# Patient Record
Sex: FEMALE | Race: WHITE | HISPANIC OR LATINO | Employment: FULL TIME | URBAN - METROPOLITAN AREA
[De-identification: names, ages, dates, MRNs, and addresses within clinical notes are randomized per-mention and may not be internally consistent; named-entity substitution may affect disease eponyms.]

---

## 2018-02-28 ENCOUNTER — CONVERSION ENCOUNTER (OUTPATIENT)
Dept: MAMMOGRAPHY | Facility: CLINIC | Age: 48
End: 2018-02-28

## 2018-06-20 ENCOUNTER — TELEPHONE (OUTPATIENT)
Dept: FAMILY MEDICINE CLINIC | Facility: CLINIC | Age: 48
End: 2018-06-20

## 2018-08-02 ENCOUNTER — OFFICE VISIT (OUTPATIENT)
Dept: FAMILY MEDICINE CLINIC | Facility: CLINIC | Age: 48
End: 2018-08-02
Payer: COMMERCIAL

## 2018-08-02 VITALS
TEMPERATURE: 98 F | HEIGHT: 60 IN | BODY MASS INDEX: 27.48 KG/M2 | SYSTOLIC BLOOD PRESSURE: 106 MMHG | HEART RATE: 74 BPM | WEIGHT: 140 LBS | OXYGEN SATURATION: 99 % | RESPIRATION RATE: 16 BRPM | DIASTOLIC BLOOD PRESSURE: 70 MMHG

## 2018-08-02 DIAGNOSIS — R73.9 HYPERGLYCEMIA: ICD-10-CM

## 2018-08-02 DIAGNOSIS — L21.9 DERMATITIS, SEBORRHEIC: ICD-10-CM

## 2018-08-02 DIAGNOSIS — Z00.01 ENCOUNTER FOR GENERAL ADULT MEDICAL EXAMINATION WITH ABNORMAL FINDINGS: ICD-10-CM

## 2018-08-02 DIAGNOSIS — R00.2 PALPITATIONS: ICD-10-CM

## 2018-08-02 DIAGNOSIS — E78.00 PURE HYPERCHOLESTEROLEMIA: ICD-10-CM

## 2018-08-02 DIAGNOSIS — R79.89 LFT ELEVATION: ICD-10-CM

## 2018-08-02 DIAGNOSIS — Q89.3 SITUS INVERSUS: Primary | ICD-10-CM

## 2018-08-02 PROBLEM — Z01.411 ENCOUNTER FOR GYNECOLOGICAL EXAMINATION WITH ABNORMAL FINDING: Status: ACTIVE | Noted: 2018-05-03

## 2018-08-02 PROCEDURE — 99396 PREV VISIT EST AGE 40-64: CPT | Performed by: FAMILY MEDICINE

## 2018-08-02 PROCEDURE — 93000 ELECTROCARDIOGRAM COMPLETE: CPT | Performed by: FAMILY MEDICINE

## 2018-08-02 RX ORDER — ERGOCALCIFEROL (VITAMIN D2) 1250 MCG
50000 CAPSULE ORAL WEEKLY
COMMUNITY
End: 2020-08-03 | Stop reason: ALTCHOICE

## 2018-08-02 RX ORDER — TRAZODONE HYDROCHLORIDE 50 MG/1
50 TABLET ORAL
COMMUNITY
End: 2018-10-02 | Stop reason: ALTCHOICE

## 2018-08-02 RX ORDER — MOMETASONE FUROATE 1 MG/ML
SOLUTION TOPICAL DAILY
Qty: 120 ML | Refills: 5 | Status: SHIPPED | OUTPATIENT
Start: 2018-08-02 | End: 2018-10-02 | Stop reason: ALTCHOICE

## 2018-08-02 NOTE — PROGRESS NOTES
Assessment/Plan:    No problem-specific Assessment & Plan notes found for this encounter  Diagnoses and all orders for this visit:    Situs inversus  -     POCT ECG    Pure hypercholesterolemia  -     Lipid Panel with Direct LDL reflex; Future    Hyperglycemia  -     Hemoglobin A1C; Future    LFT elevation  -     Comprehensive metabolic panel; Future  -     Hepatitis panel, acute; Future    Palpitations  -     CBC and differential; Future  -     TSH, 3rd generation with Free T4 reflex; Future    Dermatitis, seborrheic  -     mometasone (ELOCON) 0 1 % lotion; Apply topically daily    Other orders  -     traZODone (DESYREL) 50 mg tablet; Take 50 mg by mouth daily at bedtime  -     ergocalciferol (ERGOCALCIFEROL) 13293 units capsule; Take 50,000 Units by mouth once a week          Subjective:      Patient ID: Mary Marley is a 56 y o  female  Pt here for annual physical exam         The following portions of the patient's history were reviewed and updated as appropriate: allergies, current medications, past family history, past medical history, past social history, past surgical history and problem list     Review of Systems   Constitutional: Positive for unexpected weight change  Negative for fatigue and fever  HENT: Negative for sore throat and trouble swallowing  Eyes: Negative for photophobia  Respiratory: Negative for cough, chest tightness, shortness of breath and wheezing  Cardiovascular: Negative for chest pain, palpitations and leg swelling  Gastrointestinal: Negative for abdominal pain, blood in stool, nausea and vomiting  Genitourinary: Negative for hematuria  Neurological: Negative for dizziness, syncope, light-headedness and headaches  Hematological: Does not bruise/bleed easily           Objective:      /70 (BP Location: Left arm, Patient Position: Sitting, Cuff Size: Standard)   Pulse 74   Temp 98 °F (36 7 °C) (Oral)   Resp 16   Ht 4' 11 5" (1 511 m)   Wt 63 5 kg (140 lb)   SpO2 99%   BMI 27 80 kg/m²          Physical Exam   Constitutional: She is oriented to person, place, and time  She appears well-developed and well-nourished  Obese looking  HENT:   Head: Normocephalic  Eyes: EOM are normal  Pupils are equal, round, and reactive to light  Neck: Neck supple  Cardiovascular: Normal rate and regular rhythm  Pulmonary/Chest: Effort normal and breath sounds normal    Abdominal: Soft  Bowel sounds are normal    Musculoskeletal: Normal range of motion  Neurological: She is alert and oriented to person, place, and time  She has normal reflexes  Skin: Skin is warm and dry  Psychiatric: She has a normal mood and affect   Her behavior is normal  Judgment and thought content normal

## 2018-08-02 NOTE — ASSESSMENT & PLAN NOTE
Patient has elevation of blood sugar without diagnosis of diabetes  Are going to check hemoglobin A1c today

## 2018-08-02 NOTE — PATIENT INSTRUCTIONS
Ejercicio seguro   LO QUE NECESITA SABER:   ¿Qué es la seguridad del ejercicio? La seguridad del ejercicio incluye el uso de equipos y puestos correctos para trabbajar los músculos sin causar más daño  Usted deberá saber qué ejercicios hacer y con qué frecuencia hacerlos  También deberá saber qué hacer si siente o piensa que un ejercicio le provocó sreekanth [place]  No empiece ningún programa de ejercicios antes de hablar con palm médico  Deberá esperar hasta que la hinchazón y el dolor se vayan para comenzar a ejercitarse  ¿Qué lindsey saber antes de ejercitar? · Los ejercicios ayudan a disminuir el dolor y la inflamación después de sreekanth Larinda Stacks  También ayudan a fortalecer los músculos que rebeka soporte a las articulaciones y [place]  [place] puede ayudar a prevenir otra [place]  · Puede que necesite sreekanth pesa liviana o sreekanth handy de ejercicio para hacer algunos ejercicios  Palm médico le indicará el peso con el que debe ejercitarse  [place]ig[place]e con palm médico dónde comprar sreekanth pesa o sreekanth handy de ejercicio  · Palm médico le indicará la frecuencia de cada ejercico  También le dirá cuántas veces debe repetir cada ejercicio y cuántas series debe hacer  Le puede indicar hacer diferentes ejercicios en distintos días  · Entre en calor y estírese antes de hacer ejercicio  Use sreekanth bicicleta estacionaria o camine kaitlyn 5 a 10 minutos para que zita músculos entren en calor  El [address] a aumentar el rango de [place]  También puede aliviar el dolor muscular y ayudar a prevenir otra [place]  Palm médico le mostrará qué estiramientos debe hacer  ¿Qué lindsey hacer para ejercitar de sreekanth forma smith? · Muévase lenta y suavemente  Evite movimientos rápidos o bruscos  [place] ayudará a evitar otra lesión  · Respire normalmente  No contenga la respiración  Es importante inspirar y exhalar para no tensionarse kaitlyn el ejercicio  La tensión le podría impedir  la articulación en un rango completo de movimiento  · Deténgase si siente dolor zakia o aumento del dolor  Suspenda el ejercicio y póngase en contacto con palm médico si se presentan estos síntomas  Es normal sentir Adama Osbaldo, tremayne dolor sordo, kaitlyn el ejercicio  Practicar los ejercicios con regularidad ayudará a disminuir palm incomodidad con el paso del [place]  ¿Cuándo lindsey comunicarme con mi médico?   · Tiene dolor zakia cuando hace ejercicio o está en reposo  · Tiene preguntas o inquietudes acerca de los estiramientos o los ejercicios  ACUERDOS SOBRE PALM CUIDADO:   Usted tiene el derecho de ayudar a planear palm cuidado  Aprenda todo lo que pueda sobre palm condición y tremayne darle tratamiento  Discuta zita opciones de tratamiento con zita médicos para decidir el cuidado que usted desea recibir  Usted siempre tiene el derecho de rechazar el tratamiento  Esta información es sólo para uso en educación  Palm intención no es darle un consejo médico sobre enfermedades o tratamientos  Colsulte con palm Carollee Balta farmacéutico antes de seguir cualquier régimen médico para saber si es seguro y efectivo para usted  © 2017 [address]  Information is for End User's use only and may not be sold, redistributed or otherwise used for commercial purposes  All illustrations and images included in CareNotes® are the copyrighted property of A D A M , Inc  or German Taylor

## 2018-08-17 ENCOUNTER — APPOINTMENT (OUTPATIENT)
Dept: LAB | Facility: HOSPITAL | Age: 48
End: 2018-08-17
Payer: COMMERCIAL

## 2018-08-17 DIAGNOSIS — R00.2 PALPITATIONS: ICD-10-CM

## 2018-08-17 DIAGNOSIS — R73.9 HYPERGLYCEMIA: ICD-10-CM

## 2018-08-17 DIAGNOSIS — R79.89 LFT ELEVATION: ICD-10-CM

## 2018-08-17 DIAGNOSIS — E78.00 PURE HYPERCHOLESTEROLEMIA: ICD-10-CM

## 2018-08-17 LAB
ALBUMIN SERPL BCP-MCNC: 3.9 G/DL (ref 3–5.2)
ALP SERPL-CCNC: 76 U/L (ref 43–122)
ALT SERPL W P-5'-P-CCNC: 16 U/L (ref 9–52)
ANION GAP SERPL CALCULATED.3IONS-SCNC: 6 MMOL/L (ref 5–14)
AST SERPL W P-5'-P-CCNC: 22 U/L (ref 14–36)
BASOPHILS # BLD AUTO: 0 THOUSANDS/ΜL (ref 0–0.1)
BASOPHILS NFR BLD AUTO: 1 % (ref 0–1)
BILIRUB SERPL-MCNC: 0.2 MG/DL
BUN SERPL-MCNC: 16 MG/DL (ref 5–25)
CALCIUM SERPL-MCNC: 9.1 MG/DL (ref 8.4–10.2)
CHLORIDE SERPL-SCNC: 110 MMOL/L (ref 97–108)
CHOLEST SERPL-MCNC: 202 MG/DL
CO2 SERPL-SCNC: 27 MMOL/L (ref 22–30)
CREAT SERPL-MCNC: 0.59 MG/DL (ref 0.6–1.2)
EOSINOPHIL # BLD AUTO: 0.2 THOUSAND/ΜL (ref 0–0.4)
EOSINOPHIL NFR BLD AUTO: 4 % (ref 0–6)
ERYTHROCYTE [DISTWIDTH] IN BLOOD BY AUTOMATED COUNT: 17.2 %
GFR SERPL CREATININE-BSD FRML MDRD: 110 ML/MIN/1.73SQ M
GLUCOSE P FAST SERPL-MCNC: 97 MG/DL (ref 70–99)
HCT VFR BLD AUTO: 30 % (ref 36–46)
HDLC SERPL-MCNC: 51 MG/DL (ref 40–59)
HGB BLD-MCNC: 8.9 G/DL (ref 12–16)
HYPERCHROMIA BLD QL SMEAR: PRESENT
LDLC SERPL CALC-MCNC: 129 MG/DL
LYMPHOCYTES # BLD AUTO: 2.2 THOUSANDS/ΜL (ref 0.5–4)
LYMPHOCYTES NFR BLD AUTO: 40 % (ref 20–50)
MCH RBC QN AUTO: 20.2 PG (ref 26–34)
MCHC RBC AUTO-ENTMCNC: 29.7 G/DL (ref 31–36)
MCV RBC AUTO: 68 FL (ref 80–100)
MONOCYTES # BLD AUTO: 0.5 THOUSAND/ΜL (ref 0.2–0.9)
MONOCYTES NFR BLD AUTO: 9 % (ref 1–10)
NEUTROPHILS # BLD AUTO: 2.6 THOUSANDS/ΜL (ref 1.8–7.8)
NEUTS SEG NFR BLD AUTO: 47 % (ref 45–65)
PLATELET # BLD AUTO: 238 THOUSANDS/UL (ref 150–450)
PLATELET BLD QL SMEAR: ADEQUATE
PMV BLD AUTO: 8 FL (ref 8.9–12.7)
POTASSIUM SERPL-SCNC: 4.7 MMOL/L (ref 3.6–5)
PROT SERPL-MCNC: 7.3 G/DL (ref 5.9–8.4)
RBC # BLD AUTO: 4.41 MILLION/UL (ref 4–5.2)
RBC MORPH BLD: NORMAL
SODIUM SERPL-SCNC: 143 MMOL/L (ref 137–147)
TRIGL SERPL-MCNC: 110 MG/DL
TSH SERPL DL<=0.05 MIU/L-ACNC: 1.54 UIU/ML (ref 0.47–4.68)
WBC # BLD AUTO: 5.6 THOUSAND/UL (ref 4.5–11)

## 2018-08-17 PROCEDURE — 84443 ASSAY THYROID STIM HORMONE: CPT

## 2018-08-17 PROCEDURE — 83036 HEMOGLOBIN GLYCOSYLATED A1C: CPT

## 2018-08-17 PROCEDURE — 36415 COLL VENOUS BLD VENIPUNCTURE: CPT

## 2018-08-17 PROCEDURE — 85025 COMPLETE CBC W/AUTO DIFF WBC: CPT

## 2018-08-17 PROCEDURE — 80074 ACUTE HEPATITIS PANEL: CPT

## 2018-08-17 PROCEDURE — 80061 LIPID PANEL: CPT

## 2018-08-17 PROCEDURE — 80053 COMPREHEN METABOLIC PANEL: CPT

## 2018-08-18 LAB
EST. AVERAGE GLUCOSE BLD GHB EST-MCNC: 105 MG/DL
HAV IGM SER QL: NORMAL
HBA1C MFR BLD: 5.3 % (ref 4.2–6.3)
HBV CORE IGM SER QL: NORMAL
HBV SURFACE AG SER QL: NORMAL
HCV AB SER QL: NORMAL

## 2018-08-20 DIAGNOSIS — D50.8 OTHER IRON DEFICIENCY ANEMIA: Primary | ICD-10-CM

## 2018-08-20 RX ORDER — FERROUS SULFATE TAB EC 324 MG (65 MG FE EQUIVALENT) 324 (65 FE) MG
324 TABLET DELAYED RESPONSE ORAL
Qty: 60 TABLET | Refills: 0 | Status: SHIPPED | OUTPATIENT
Start: 2018-08-20 | End: 2018-10-02 | Stop reason: ALTCHOICE

## 2018-10-02 ENCOUNTER — OFFICE VISIT (OUTPATIENT)
Dept: FAMILY MEDICINE CLINIC | Facility: CLINIC | Age: 48
End: 2018-10-02
Payer: COMMERCIAL

## 2018-10-02 VITALS
HEART RATE: 74 BPM | RESPIRATION RATE: 16 BRPM | WEIGHT: 138 LBS | BODY MASS INDEX: 27.09 KG/M2 | DIASTOLIC BLOOD PRESSURE: 70 MMHG | OXYGEN SATURATION: 99 % | TEMPERATURE: 98 F | SYSTOLIC BLOOD PRESSURE: 110 MMHG | HEIGHT: 60 IN

## 2018-10-02 DIAGNOSIS — L21.9 SEBORRHEIC DERMATITIS: ICD-10-CM

## 2018-10-02 DIAGNOSIS — L21.9 DERMATITIS, SEBORRHEIC: ICD-10-CM

## 2018-10-02 DIAGNOSIS — M25.562 ACUTE PAIN OF LEFT KNEE: Primary | ICD-10-CM

## 2018-10-02 PROCEDURE — 99214 OFFICE O/P EST MOD 30 MIN: CPT | Performed by: FAMILY MEDICINE

## 2018-10-02 PROCEDURE — 1036F TOBACCO NON-USER: CPT | Performed by: FAMILY MEDICINE

## 2018-10-02 RX ORDER — LANOLIN ALCOHOL/MO/W.PET/CERES
1 CREAM (GRAM) TOPICAL 3 TIMES DAILY
Qty: 30 TABLET | Refills: 0 | Status: SHIPPED | OUTPATIENT
Start: 2018-10-02 | End: 2020-08-03 | Stop reason: ALTCHOICE

## 2018-10-02 RX ORDER — KETOCONAZOLE 20 MG/ML
1 SHAMPOO TOPICAL 2 TIMES WEEKLY
Qty: 120 ML | Refills: 5 | Status: SHIPPED | OUTPATIENT
Start: 2018-10-04 | End: 2020-08-03 | Stop reason: ALTCHOICE

## 2018-10-02 RX ORDER — MOMETASONE FUROATE 1 MG/ML
SOLUTION TOPICAL DAILY
Qty: 120 ML | Refills: 5 | Status: SHIPPED | OUTPATIENT
Start: 2018-10-02 | End: 2020-08-03 | Stop reason: ALTCHOICE

## 2018-10-02 NOTE — PROGRESS NOTES
Assessment/Plan:  1  Acute pain of left knee  The patient was advised that NSAID-type medications have two very important potential side effects: gastrointestinal irritation including hemorrhage and renal injuries  She was asked to take the medication with food and to stop if she experiences any GI upset  I asked her to call for vomiting, abdominal pain or black/bloody stools  The patient expresses understanding of these issues and questions were answered  strengthening the quads muscle with extension exercise of both knees with also help to prevent more damage  Off of label- glucosamine-chondroitin 500-400 MG tablet; Take 1 tablet by mouth 3 (three) times a day  Dispense: 30 tablet; Refill: 0    2  Dermatitis, seborrheic   is stable in improved from previous will continue same treatment as needed  - mometasone (ELOCON) 0 1 % lotion; Apply topically daily  Dispense: 120 mL; Refill: 5  - ketoconazole (NIZORAL) 2 % shampoo; Apply 1 application topically 2 (two) times a week  Dispense: 120 mL; Refill: 5        No problem-specific Assessment & Plan notes found for this encounter  There are no diagnoses linked to this encounter  Subjective:      Patient ID: Mirella Arambula is a 56 y o  female  Pt here with complaints of knee pain  Anemia   There has been no abdominal pain, bruising/bleeding easily, fever, light-headedness or palpitations  Knee Pain          The following portions of the patient's history were reviewed and updated as appropriate: allergies, current medications, past family history, past medical history, past social history, past surgical history and problem list     Review of Systems   Constitutional: Positive for unexpected weight change  Negative for fatigue and fever  HENT: Negative for sore throat and trouble swallowing  Eyes: Negative for photophobia  Respiratory: Negative for cough, chest tightness, shortness of breath and wheezing      Cardiovascular: Negative for chest pain, palpitations and leg swelling  Gastrointestinal: Negative for abdominal pain, blood in stool, nausea and vomiting  Genitourinary: Negative for hematuria  Musculoskeletal:        Bilateral knee pain   Neurological: Negative for dizziness, syncope, light-headedness and headaches  Hematological: Does not bruise/bleed easily  Objective:      /70 (BP Location: Left arm, Patient Position: Sitting, Cuff Size: Standard)   Pulse 74   Temp 98 °F (36 7 °C) (Oral)   Resp 16   Ht 4' 11 5" (1 511 m)   Wt 62 6 kg (138 lb)   SpO2 99%   Breastfeeding? No   BMI 27 41 kg/m²          Physical Exam   Constitutional: She is oriented to person, place, and time  She appears well-developed and well-nourished  Cardiovascular: Normal rate, regular rhythm and normal heart sounds  Pulmonary/Chest: Effort normal and breath sounds normal    Abdominal: Soft  Bowel sounds are normal    Musculoskeletal: She exhibits tenderness (  But another formation or arthritis is seen no crepitus  Range of motion is acceptable  )  Neurological: She is alert and oriented to person, place, and time  She has normal reflexes  Skin: Skin is warm and dry  Psychiatric: She has a normal mood and affect   Her behavior is normal  Judgment and thought content normal

## 2018-10-02 NOTE — PATIENT INSTRUCTIONS
Dolor de rodilla   LO QUE NECESITA SABER:   El dolor de rodilla puede empezar de forma repentina, o ser un problema a Jackelyn Fur  Puede sentir dolor en la parte lateral, delantera o trasera de la rodilla  Puede tener la rodilla rígida e hinchada  Puede oír sonidos de chasquido o sentir que la rodilla cede o se le bloquea al andar  Puede sentir dolor cuando se sienta, se pone de pie, camina o sube y baja escaleras  El dolor de rodilla puede estar provocado por condiciones tremayne obesidad, inflamación, esguinces o desgarros de los ligamentos o los tendones  INSTRUCCIONES SOBRE EL BEL HOSPITALARIA:   Sukumar Hernandez en 24 horas a sreekanth cecilio de seguimiento con palm médico o tremayne se le indique:  Omer Doyle necesite tratamientos de seguimiento, tremayne inyecciones de esteroides para reducir el dolor  Anote zita preguntas para que se acuerde de hacerlas kaitlyn zita visitas  Cuidados personales:   · Descanse  la rodilla para que se pueda curar  Limite las actividades que aumenten el dolor  · El hielo  puede reducir la inflamación  Envuelva hielo en sreekanth lolly y Rosalinda que le recomienden y con la frecuencia que le indiquen  · La compresión  con sreekanth Adams Ishihara o sreekanth venda puede ayudar a reducir la hinchazón  Use sreekanth férula o venda solamente si sigue las instrucciones del kilo  · La elevación  ayuda a calmar el dolor y bajar la inflamación  Eleve la rodilla mientras esté sentado o acostado  Apoye la pierna sobre almohadones para mantener la rodilla por encima del corazón  Medicamentos:   · AINEs (Analgésicos antiinflamatorios no esteroides)  pueden disminuir la inflamación y el dolor o la fiebre  Laura medicamento esta disponible con o sin sreekanth receta médica  Los AINEs pueden causar sangrado estomacal o problemas renales en ciertas personas  Si usted shantelle un medicamento anticoagulante, siempre pregúntele a palm médico si los KATIE son seguros para usted   Siempre quyen la etiqueta de laura medicamento y [address]  · El acetaminofén  Kissousa el dolor y baja la fiebre  Está disponible sin receta médica  Pregunte cuánto ayaka y cuándo  [address]  El acetaminofén puede causar daño en el hígado cuando no se shantelle de forma correcta  · [address] zita medicamentos tremayne se le haya indicado  Consulte con chang médico si usted regina que chang medicamento no le está ayudando o si presenta efectos secundarios  Infórmele si es alérgico a cualquier medicamento  Mantenga sreekanth lista actualizada de los [employer], las vitaminas y los productos herbales que shantelle  Incluya los siguientes datos de los medicamentos: cantidad, frecuencia y motivo de administración  Traiga con usted la lista o los envases de la píldoras a zita citas de seguimiento  Lleve la lista de los medicamentos con usted en kilo de sreekanth emergencia  Ejercítese según indicaciones:  Es posible que deba consultar con un fisioterapeuta o hacer los ejercicios que le recomienden para mejorar la movilidad y reducir el dolor  Leonel Comings le aconsejen que camine, nade o monte en bicicleta  Siga chang plan de ejercicios exactamente del modo que le fitzgerald indicado para evitar más lesiones  Pregúntele a chang Luis Fernando Fanti vitaminas y minerales son adecuados para usted  · Usted tiene preguntas o inquietudes acerca de chang condición o cuidado  Regrese a la jose r de emergencias si:   · El dolor empeora, [address]  · No puede flexionar o enderezar la pierna completamente  · La hinchazón alrededor de la rodilla no disminuye a pesar del tratamiento  · La rodilla le duele y se nota caliente al tacto  © 2017 [address] Information is for End User's use only and may not be sold, redistributed or otherwise used for commercial purposes  All illustrations and images included in CareNotes® are the copyrighted property of A D A M , Inc  or German Taylor  Esta información es sólo para uso en educación   Chang intención no es darle un consejo [employer] o tratamientos  Colsulte con palm Gala Primer farmacéutico antes de seguir cualquier régimen médico para saber si es seguro y efectivo para usted

## 2019-06-03 ENCOUNTER — OFFICE VISIT (OUTPATIENT)
Dept: FAMILY MEDICINE CLINIC | Facility: CLINIC | Age: 49
End: 2019-06-03
Payer: COMMERCIAL

## 2019-06-03 VITALS
HEART RATE: 73 BPM | BODY MASS INDEX: 27.68 KG/M2 | DIASTOLIC BLOOD PRESSURE: 62 MMHG | SYSTOLIC BLOOD PRESSURE: 118 MMHG | HEIGHT: 60 IN | OXYGEN SATURATION: 97 % | RESPIRATION RATE: 20 BRPM | WEIGHT: 141 LBS | TEMPERATURE: 97.9 F

## 2019-06-03 DIAGNOSIS — Z12.39 SCREENING FOR BREAST CANCER: Primary | ICD-10-CM

## 2019-06-03 DIAGNOSIS — L30.8 PSORIASIFORM ECZEMA: ICD-10-CM

## 2019-06-03 PROCEDURE — 99213 OFFICE O/P EST LOW 20 MIN: CPT | Performed by: FAMILY MEDICINE

## 2019-06-03 PROCEDURE — 3008F BODY MASS INDEX DOCD: CPT | Performed by: FAMILY MEDICINE

## 2019-06-03 RX ORDER — TACROLIMUS 0.3 MG/G
OINTMENT TOPICAL 2 TIMES DAILY
Qty: 100 G | Refills: 0 | Status: SHIPPED | OUTPATIENT
Start: 2019-06-03 | End: 2020-08-03 | Stop reason: ALTCHOICE

## 2019-06-03 RX ORDER — CLOBETASOL PROPIONATE 0.46 MG/ML
SOLUTION TOPICAL 2 TIMES DAILY
Qty: 50 ML | Refills: 0 | Status: SHIPPED | OUTPATIENT
Start: 2019-06-03 | End: 2020-08-03 | Stop reason: ALTCHOICE

## 2019-06-10 ENCOUNTER — HOSPITAL ENCOUNTER (OUTPATIENT)
Dept: MAMMOGRAPHY | Facility: CLINIC | Age: 49
Discharge: HOME | End: 2019-06-10
Payer: COMMERCIAL

## 2019-06-10 VITALS — HEIGHT: 60 IN | WEIGHT: 141 LBS | BODY MASS INDEX: 27.68 KG/M2

## 2019-06-10 DIAGNOSIS — Z12.39 SCREENING FOR BREAST CANCER: ICD-10-CM

## 2019-06-10 PROCEDURE — 77067 SCR MAMMO BI INCL CAD: CPT

## 2019-06-18 ENCOUNTER — HOSPITAL ENCOUNTER (OUTPATIENT)
Dept: MAMMOGRAPHY | Facility: CLINIC | Age: 49
Discharge: HOME | End: 2019-06-18
Payer: COMMERCIAL

## 2019-06-18 VITALS — BODY MASS INDEX: 27.68 KG/M2 | WEIGHT: 141 LBS | HEIGHT: 60 IN

## 2019-06-18 DIAGNOSIS — R92.8 ABNORMAL SCREENING MAMMOGRAM: ICD-10-CM

## 2019-06-18 PROCEDURE — 77065 DX MAMMO INCL CAD UNI: CPT

## 2019-12-03 ENCOUNTER — TRANSCRIBE ORDERS (OUTPATIENT)
Dept: ADMINISTRATIVE | Facility: HOSPITAL | Age: 49
End: 2019-12-03

## 2019-12-03 DIAGNOSIS — R92.8 ABNORMAL MAMMOGRAM: Primary | ICD-10-CM

## 2020-01-02 ENCOUNTER — HOSPITAL ENCOUNTER (OUTPATIENT)
Dept: MAMMOGRAPHY | Facility: CLINIC | Age: 50
Discharge: HOME | End: 2020-01-02
Payer: COMMERCIAL

## 2020-01-02 VITALS — HEIGHT: 60 IN | BODY MASS INDEX: 27.88 KG/M2 | WEIGHT: 142 LBS

## 2020-01-02 DIAGNOSIS — R92.8 ABNORMAL MAMMOGRAM: ICD-10-CM

## 2020-01-02 PROCEDURE — 77065 DX MAMMO INCL CAD UNI: CPT

## 2020-01-07 RX ORDER — BIMATOPROST 0.01 %
DROPS OPHTHALMIC (EYE)
COMMUNITY
Start: 2019-12-11 | End: 2020-08-03 | Stop reason: ALTCHOICE

## 2020-06-10 ENCOUNTER — TELEPHONE (OUTPATIENT)
Dept: FAMILY MEDICINE CLINIC | Facility: CLINIC | Age: 50
End: 2020-06-10

## 2020-08-03 ENCOUNTER — OFFICE VISIT (OUTPATIENT)
Dept: FAMILY MEDICINE CLINIC | Facility: CLINIC | Age: 50
End: 2020-08-03
Payer: COMMERCIAL

## 2020-08-03 VITALS
WEIGHT: 145 LBS | OXYGEN SATURATION: 96 % | DIASTOLIC BLOOD PRESSURE: 70 MMHG | HEIGHT: 59 IN | SYSTOLIC BLOOD PRESSURE: 110 MMHG | HEART RATE: 78 BPM | RESPIRATION RATE: 16 BRPM | BODY MASS INDEX: 29.23 KG/M2 | TEMPERATURE: 97.9 F

## 2020-08-03 DIAGNOSIS — Z12.39 SCREENING FOR BREAST CANCER: ICD-10-CM

## 2020-08-03 DIAGNOSIS — Z11.4 ENCOUNTER FOR HUMAN IMMUNODEFICIENCY VIRUS TEST: ICD-10-CM

## 2020-08-03 DIAGNOSIS — Z12.4 CERVICAL CANCER SCREENING: ICD-10-CM

## 2020-08-03 DIAGNOSIS — R63.5 WEIGHT GAIN: ICD-10-CM

## 2020-08-03 DIAGNOSIS — Z00.01 ENCOUNTER FOR GENERAL ADULT MEDICAL EXAMINATION WITH ABNORMAL FINDINGS: Primary | ICD-10-CM

## 2020-08-03 PROCEDURE — 3725F SCREEN DEPRESSION PERFORMED: CPT | Performed by: FAMILY MEDICINE

## 2020-08-03 PROCEDURE — 99396 PREV VISIT EST AGE 40-64: CPT | Performed by: FAMILY MEDICINE

## 2020-08-03 PROCEDURE — 1036F TOBACCO NON-USER: CPT | Performed by: FAMILY MEDICINE

## 2020-08-03 PROCEDURE — 99213 OFFICE O/P EST LOW 20 MIN: CPT | Performed by: FAMILY MEDICINE

## 2020-08-03 PROCEDURE — 3008F BODY MASS INDEX DOCD: CPT | Performed by: FAMILY MEDICINE

## 2020-08-03 RX ORDER — TOPIRAMATE 25 MG/1
25 CAPSULE, COATED PELLETS ORAL 2 TIMES DAILY
Qty: 90 CAPSULE | Refills: 3 | Status: SHIPPED | OUTPATIENT
Start: 2020-08-03 | End: 2020-11-04 | Stop reason: ALTCHOICE

## 2020-08-03 RX ORDER — FLUOCINOLONE ACETONIDE 0.1 MG/ML
SOLUTION TOPICAL
COMMUNITY
Start: 2020-07-22 | End: 2021-07-15 | Stop reason: ALTCHOICE

## 2020-08-03 NOTE — PROGRESS NOTES
Assessment/Plan:  1  Encounter for general adult medical examination with abnormal findings  She does not have any major abnormalities in the physical exam but complaints  Advise about exercise and following low carb diet  She is worry about getting weight and I explained only way to keep the weight down his with exercise and a routine program following also increased amount of protein in diet instead of carbs  - CBC and differential; Future  - Comprehensive metabolic panel; Future  - Lipid panel; Future    2  Encounter for human immunodeficiency virus test  Once in life is time HIV screening is indicated  Explained to patient the need for the test  - HIV 1/2 Antigen/Antibody (4th Generation) w Reflex [facility]; Future    3  Screening for breast cancer  The importance some mammogram every year was stressed to patient  - Mammo screening bilateral w 3d & cad; Future    4  Cervical cancer screening  Her menstrual abnormalities are not major and I am asking the opinion of gyn regarding perimenopause symptoms   - Ambulatory referral to Obstetrics / Gynecology; Future    5  Weight gain  Medication is no or wheeze in the with good results and patient may be frustrated with no improving the weight control  The main reason the before not losing weight is this sitting in the decision to stop excessive amount of carbs in diet and start a serious and regular program of exercise  Until her mind does not change will be very difficult to help with weight control   - topiramate (TOPAMAX) 25 mg sprinkle capsule; Take 1 capsule (25 mg total) by mouth 2 (two) times a day  Dispense: 90 capsule; Refill: 3    No problem-specific Assessment & Plan notes found for this encounter  Diagnoses and all orders for this visit:    Encounter for general adult medical examination with abnormal findings  -     CBC and differential; Future  -     Comprehensive metabolic panel; Future  -     Lipid panel;  Future    Encounter for human immunodeficiency virus test  -     HIV 1/2 Antigen/Antibody (4th Generation) w Reflex [facility]; Future    Screening for breast cancer  -     Mammo screening bilateral w 3d & cad; Future    Cervical cancer screening  -     Ambulatory referral to Obstetrics / Gynecology; Future    Weight gain  -     topiramate (TOPAMAX) 25 mg sprinkle capsule; Take 1 capsule (25 mg total) by mouth 2 (two) times a day    Other orders  -     fluocinolone (SYNALAR) 0 01 % external solution; APPLY TO SCALP ONCE TO TWICE A DAY FOR 2 WEEKS        BMI Counseling: Body mass index is 29 29 kg/m²  The BMI is above normal  Exercise recommendations include exercising 3-5 times per week  Subjective:      Patient ID: Niels Brandon is a 51 y o  female  HPI  Patient is here for PE, not having any acute distress  She has pelvic pain and abnormal menstrual periods  The following portions of the patient's history were reviewed and updated as appropriate: allergies, current medications, past family history, past medical history, past social history, past surgical history and problem list     Review of Systems   Constitutional: Negative for diaphoresis, fatigue, fever and unexpected weight change  Respiratory: Negative for cough, chest tightness, shortness of breath and wheezing  Cardiovascular: Negative for chest pain, palpitations and leg swelling  Gastrointestinal: Negative for abdominal pain, blood in stool and constipation  Genitourinary: Positive for pelvic pain, vaginal bleeding and vaginal pain  Neurological: Negative for dizziness, syncope, light-headedness and headaches  Hematological: Does not bruise/bleed easily  Psychiatric/Behavioral: Negative for behavioral problems, self-injury and sleep disturbance  The patient is not nervous/anxious            Objective:      /70 (BP Location: Left arm, Patient Position: Sitting, Cuff Size: Standard)   Pulse 78   Temp 97 9 °F (36 6 °C) (Oral)   Resp 16   Ht 4' 11"   Wt 65 8 kg (145 lb)   LMP 04/15/2019 (Approximate)   SpO2 96%   BMI 29 29 kg/m²          Physical Exam  HENT:      Head: Normocephalic  Eyes:      Pupils: Pupils are equal, round, and reactive to light  Neck:      Musculoskeletal: Neck supple  Cardiovascular:      Rate and Rhythm: Normal rate and regular rhythm  Pulmonary:      Effort: Pulmonary effort is normal    Abdominal:      Palpations: Abdomen is soft  Tenderness: There is abdominal tenderness (In the pelvic area)  Musculoskeletal: Normal range of motion  Skin:     General: Skin is warm and dry  Neurological:      Mental Status: She is alert     Psychiatric:         Behavior: Behavior normal

## 2020-08-03 NOTE — PROGRESS NOTES
Assessment/Plan:    No problem-specific Assessment & Plan notes found for this encounter  Diagnoses and all orders for this visit:    Encounter for general adult medical examination with abnormal findings  -     CBC and differential; Future  -     Comprehensive metabolic panel; Future  -     Lipid panel; Future    Encounter for human immunodeficiency virus test  -     HIV 1/2 Antigen/Antibody (4th Generation) w Reflex [facility]; Future    Screening for breast cancer  -     Mammo screening bilateral w 3d & cad; Future    Cervical cancer screening  -     Ambulatory referral to Obstetrics / Gynecology; Future    Other orders  -     fluocinolone (SYNALAR) 0 01 % external solution; APPLY TO SCALP ONCE TO TWICE A DAY FOR 2 WEEKS          Subjective:      Patient ID: Mary Black is a 51 y o  female  HPI  Patient is here for PE, not having any acute distress  The following portions of the patient's history were reviewed and updated as appropriate: allergies, current medications, past family history, past medical history, past social history, past surgical history and problem list     Review of Systems   Constitutional: Negative for diaphoresis, fatigue, fever and unexpected weight change  Respiratory: Negative for cough, chest tightness, shortness of breath and wheezing  Cardiovascular: Negative for chest pain, palpitations and leg swelling  Gastrointestinal: Negative for abdominal pain, blood in stool and constipation  Neurological: Negative for dizziness, syncope, light-headedness and headaches  Hematological: Does not bruise/bleed easily  Psychiatric/Behavioral: Negative for behavioral problems, self-injury and sleep disturbance  The patient is not nervous/anxious            Objective:      /70 (BP Location: Left arm, Patient Position: Sitting, Cuff Size: Standard)   Pulse 78   Temp 97 9 °F (36 6 °C) (Oral)   Resp 16   Ht 4' 11"   Wt 65 8 kg (145 lb)   LMP 04/15/2019 (Approximate) SpO2 96%   BMI 29 29 kg/m²          Physical Exam   HENT:   Head: Normocephalic  Eyes: Pupils are equal, round, and reactive to light  Neck: Neck supple  Cardiovascular: Normal rate and regular rhythm  Pulmonary/Chest: Effort normal    Abdominal: Soft  Musculoskeletal:      Comments: Left foot cyst on dorsal area  Neurological: She is alert  Skin: Skin is warm and dry     Psychiatric: Her behavior is normal

## 2020-08-11 PROBLEM — R63.5 WEIGHT GAIN: Status: ACTIVE | Noted: 2020-08-11

## 2020-08-11 NOTE — ASSESSMENT & PLAN NOTE
5  Weight gain  Medication is no always can in the with good results and patient may be frustrated with no improving the weight control  The main reason the patient is due not lose weight is the lack of decision to stop excessive amount of carbs in diet and start a serious and regular program of exercise  Until her mind does not change it will be very difficult to help her with weight control  We will have a trial of medication with the above advise  - topiramate (TOPAMAX) 25 mg sprinkle capsule; Take 1 capsule (25 mg total) by mouth 2 (two) times a day  Dispense: 90 capsule;  Refill: 3

## 2020-08-11 NOTE — PROGRESS NOTES
Assessment/Plan:    Weight gain  5  Weight gain  Medication is no always can in the with good results and patient may be frustrated with no improving the weight control  The main reason the patient is due not lose weight is the lack of decision to stop excessive amount of carbs in diet and start a serious and regular program of exercise  Until her mind does not change it will be very difficult to help her with weight control  We will have a trial of medication with the above advise  - topiramate (TOPAMAX) 25 mg sprinkle capsule; Take 1 capsule (25 mg total) by mouth 2 (two) times a day  Dispense: 90 capsule; Refill: 3       Diagnoses and all orders for this visit:    Encounter for general adult medical examination with abnormal findings  -     CBC and differential; Future  -     Comprehensive metabolic panel; Future  -     Lipid panel; Future    Encounter for human immunodeficiency virus test  -     HIV 1/2 Antigen/Antibody (4th Generation) w Reflex [facility]; Future    Screening for breast cancer  -     Mammo screening bilateral w 3d & cad; Future    Cervical cancer screening  -     Ambulatory referral to Obstetrics / Gynecology; Future    Weight gain  -     topiramate (TOPAMAX) 25 mg sprinkle capsule; Take 1 capsule (25 mg total) by mouth 2 (two) times a day    Other orders  -     fluocinolone (SYNALAR) 0 01 % external solution; APPLY TO SCALP ONCE TO TWICE A DAY FOR 2 WEEKS          Subjective:      Patient ID: Mirella Arambula is a 51 y o  female  Weight gain: Mirella Arambula, 51 y o  who reports gaining weight progressively  She does not exercise in a regular basis or uses eating healthy habits; She gained 20 lbs in the past 1 year;  Denies serious comorbidity; She snores but never had a sleep study in the past   also complains also of pain in knees and ankle when standing for a long period of time  Weight is more prominent in her abdominal area  Patient denies having eating disorder   She denies hypothyroidism, never tested for adrenal disorder  The following portions of the patient's history were reviewed and updated as appropriate: allergies, current medications, past family history, past medical history, past social history, past surgical history and problem list     Review of Systems   Constitutional: Positive for unexpected weight change (Gaining weight 12 lb in the last one year)  Negative for diaphoresis, fatigue and fever  Respiratory: Negative for cough, chest tightness, shortness of breath and wheezing  Cardiovascular: Negative for chest pain, palpitations and leg swelling  Gastrointestinal: Negative for abdominal pain, blood in stool and constipation  Genitourinary: Positive for menstrual problem, pelvic pain, vaginal bleeding and vaginal pain  Neurological: Negative for dizziness, syncope, light-headedness and headaches  Hematological: Does not bruise/bleed easily  Psychiatric/Behavioral: Negative for behavioral problems, self-injury and sleep disturbance  The patient is not nervous/anxious  Objective:      /70 (BP Location: Left arm, Patient Position: Sitting, Cuff Size: Standard)   Pulse 78   Temp 97 9 °F (36 6 °C) (Oral)   Resp 16   Ht 4' 11" (1 499 m)   Wt 65 8 kg (145 lb)   LMP 04/15/2019 (Approximate)   SpO2 96%   BMI 29 29 kg/m²          Physical Exam  HENT:      Head: Normocephalic  Eyes:      Pupils: Pupils are equal, round, and reactive to light  Neck:      Musculoskeletal: Neck supple  Cardiovascular:      Rate and Rhythm: Normal rate and regular rhythm  Pulmonary:      Effort: Pulmonary effort is normal    Abdominal:      Palpations: Abdomen is soft  Musculoskeletal: Normal range of motion  Skin:     General: Skin is warm and dry  Neurological:      Mental Status: She is alert     Psychiatric:         Behavior: Behavior normal

## 2020-08-29 ENCOUNTER — LAB (OUTPATIENT)
Dept: LAB | Facility: HOSPITAL | Age: 50
End: 2020-08-29
Payer: COMMERCIAL

## 2020-08-29 DIAGNOSIS — Z00.01 ENCOUNTER FOR GENERAL ADULT MEDICAL EXAMINATION WITH ABNORMAL FINDINGS: ICD-10-CM

## 2020-08-29 DIAGNOSIS — Z11.4 ENCOUNTER FOR HUMAN IMMUNODEFICIENCY VIRUS TEST: ICD-10-CM

## 2020-08-29 LAB
ALBUMIN SERPL BCP-MCNC: 3.7 G/DL (ref 3.5–5)
ALP SERPL-CCNC: 73 U/L (ref 46–116)
ALT SERPL W P-5'-P-CCNC: 20 U/L (ref 12–78)
ANION GAP SERPL CALCULATED.3IONS-SCNC: 4 MMOL/L (ref 4–13)
AST SERPL W P-5'-P-CCNC: 15 U/L (ref 5–45)
BASOPHILS # BLD AUTO: 0.04 THOUSANDS/ΜL (ref 0–0.1)
BASOPHILS NFR BLD AUTO: 1 % (ref 0–1)
BILIRUB SERPL-MCNC: 0.62 MG/DL (ref 0.2–1)
BUN SERPL-MCNC: 15 MG/DL (ref 5–25)
CALCIUM SERPL-MCNC: 9.3 MG/DL (ref 8.3–10.1)
CHLORIDE SERPL-SCNC: 108 MMOL/L (ref 100–108)
CHOLEST SERPL-MCNC: 220 MG/DL (ref 50–200)
CO2 SERPL-SCNC: 29 MMOL/L (ref 21–32)
CREAT SERPL-MCNC: 0.88 MG/DL (ref 0.6–1.3)
EOSINOPHIL # BLD AUTO: 0.15 THOUSAND/ΜL (ref 0–0.61)
EOSINOPHIL NFR BLD AUTO: 2 % (ref 0–6)
ERYTHROCYTE [DISTWIDTH] IN BLOOD BY AUTOMATED COUNT: 11.9 % (ref 11.6–15.1)
GFR SERPL CREATININE-BSD FRML MDRD: 78 ML/MIN/1.73SQ M
GLUCOSE P FAST SERPL-MCNC: 98 MG/DL (ref 65–99)
HCT VFR BLD AUTO: 45.2 % (ref 34.8–46.1)
HDLC SERPL-MCNC: 50 MG/DL
HGB BLD-MCNC: 14.6 G/DL (ref 11.5–15.4)
IMM GRANULOCYTES # BLD AUTO: 0.01 THOUSAND/UL (ref 0–0.2)
IMM GRANULOCYTES NFR BLD AUTO: 0 % (ref 0–2)
LDLC SERPL CALC-MCNC: 146 MG/DL (ref 0–100)
LYMPHOCYTES # BLD AUTO: 3.01 THOUSANDS/ΜL (ref 0.6–4.47)
LYMPHOCYTES NFR BLD AUTO: 48 % (ref 14–44)
MCH RBC QN AUTO: 30.7 PG (ref 26.8–34.3)
MCHC RBC AUTO-ENTMCNC: 32.3 G/DL (ref 31.4–37.4)
MCV RBC AUTO: 95 FL (ref 82–98)
MONOCYTES # BLD AUTO: 0.48 THOUSAND/ΜL (ref 0.17–1.22)
MONOCYTES NFR BLD AUTO: 8 % (ref 4–12)
NEUTROPHILS # BLD AUTO: 2.52 THOUSANDS/ΜL (ref 1.85–7.62)
NEUTS SEG NFR BLD AUTO: 41 % (ref 43–75)
NONHDLC SERPL-MCNC: 170 MG/DL
NRBC BLD AUTO-RTO: 0 /100 WBCS
PLATELET # BLD AUTO: 190 THOUSANDS/UL (ref 149–390)
PMV BLD AUTO: 9.5 FL (ref 8.9–12.7)
POTASSIUM SERPL-SCNC: 5.1 MMOL/L (ref 3.5–5.3)
PROT SERPL-MCNC: 7.8 G/DL (ref 6.4–8.2)
RBC # BLD AUTO: 4.76 MILLION/UL (ref 3.81–5.12)
SODIUM SERPL-SCNC: 141 MMOL/L (ref 136–145)
TRIGL SERPL-MCNC: 120 MG/DL
WBC # BLD AUTO: 6.21 THOUSAND/UL (ref 4.31–10.16)

## 2020-08-29 PROCEDURE — 36415 COLL VENOUS BLD VENIPUNCTURE: CPT

## 2020-08-29 PROCEDURE — 80053 COMPREHEN METABOLIC PANEL: CPT

## 2020-08-29 PROCEDURE — 80061 LIPID PANEL: CPT

## 2020-08-29 PROCEDURE — 87389 HIV-1 AG W/HIV-1&-2 AB AG IA: CPT

## 2020-08-29 PROCEDURE — 85025 COMPLETE CBC W/AUTO DIFF WBC: CPT

## 2020-08-31 LAB — HIV 1+2 AB+HIV1 P24 AG SERPL QL IA: NORMAL

## 2020-08-31 NOTE — RESULT ENCOUNTER NOTE
Please call patient, cholesterol is high:  as we discussed in previous visit, avoid excessive amount of saturated fat in diet  Exercise as much as you can    (Saturated fat sources in diet fatty beef,  lamb,  pork,  poultry with skin,  beef fat (tallow),  lard and cream,  butter,  cheese and  other dairy products made from whole or reduced-fat (2 percent) milk)

## 2020-09-22 ENCOUNTER — OFFICE VISIT (OUTPATIENT)
Dept: OBGYN CLINIC | Facility: CLINIC | Age: 50
End: 2020-09-22

## 2020-09-22 VITALS — HEART RATE: 76 BPM | DIASTOLIC BLOOD PRESSURE: 83 MMHG | TEMPERATURE: 97.6 F | SYSTOLIC BLOOD PRESSURE: 118 MMHG

## 2020-09-22 DIAGNOSIS — Z01.419 ENCOUNTER FOR ANNUAL ROUTINE GYNECOLOGICAL EXAMINATION: Primary | ICD-10-CM

## 2020-09-22 DIAGNOSIS — N95.0 POSTMENOPAUSAL VAGINAL BLEEDING: ICD-10-CM

## 2020-09-22 PROCEDURE — 99386 PREV VISIT NEW AGE 40-64: CPT | Performed by: NURSE PRACTITIONER

## 2020-09-22 NOTE — PATIENT INSTRUCTIONS
Return for endometrial Bx  Call with needs or concerns    COVID-19 Instructions    If you are having any of the following:  Cough   Shortness of breath   Fever  If traveled within past 2 weeks internationally or to high risk [place] states  Or been in contact with someone that has     Please call either:   Your PCP office  [phone], option [phone]    They will screen you over the phone and direct you to the nearest appropriate testing location    DO NOT go to your PCP or OB office without calling first

## 2020-09-22 NOTE — PROGRESS NOTES
Assessment/Plan     Diagnoses and all orders for this visit:    Encounter for annual routine gynecological examination    Postmenopausal vaginal bleeding         Explained need for endometrial Bx and procedure    Plan  Patient Instructions   Return for endometrial Bx  Call with needs or concerns    Return for follow up visit, endometrial Bx  Pt verbalized understanding of all discussed  Subjective      Pedro Pablo Cloud is a 51 y o  female who presents for annual exam  The patient has  complaint today of vaginal spotting in July after no bleeding x 2 years of no VB  The patient is sexually active  1 partner x 16 years GYN screening history: last pap: was normal and , no HPV testing  The patient is not taking hormone replacement therapy  Patient denies post-menopausal vaginal bleeding   had vaginal spotting 2020 in July after 2 years of no VB  Monia Le The patient participates in regular exercise: yes  Discussed calcium and vitamin  The patient reports that there is not domestic violence in her life  The patient is not having menopausal symptoms none  Pt's only complaint today was vaginal spotting in July after 2 years of no VB   Menstrual History:  OB History        1    Para   1    Term   1            AB        Living           SAB        TAB        Ectopic        Multiple        Live Births                    Menarche age: 14  Patient's last menstrual period was 04/15/2019 (approximate)  4/15/2018       The following portions of the patient's history were reviewed and updated as appropriate: allergies, current medications, past family history, past medical history, past social history, past surgical history and problem list     Review of Systems  Pertinent items are noted in HPI       Objective      /83   Pulse 76   Temp 97 6 °F (36 4 °C)   LMP 04/15/2019 (Approximate)      General:   alert and oriented, in no acute distress, alert, appears stated age and cooperative   Heart: regular rate and rhythm, S1, S2 normal, no murmur, click, rub or gallop   Lungs: clear to auscultation bilaterally, WNL respiratory effort, negative cough or SOB   Thyroid: Negative masses   Abdomen: soft, non-tender, without masses or organomegaly   Vulva: normal   Vagina: normal mucosa   Cervix: no cervical motion tenderness and no lesions   Uterus: non-tender, normal shape and consistency   Adnexa: normal adnexa   Breasts: NT, negative masses, discharge or dimpling  Negative fever         Lab Review  WNL PAP 2018, mammogram scheduled, to return for endometrial BX

## 2020-10-06 ENCOUNTER — TELEPHONE (OUTPATIENT)
Dept: OBGYN CLINIC | Facility: CLINIC | Age: 50
End: 2020-10-06

## 2020-10-07 ENCOUNTER — PROCEDURE VISIT (OUTPATIENT)
Dept: OBGYN CLINIC | Facility: CLINIC | Age: 50
End: 2020-10-07

## 2020-10-07 VITALS
SYSTOLIC BLOOD PRESSURE: 98 MMHG | BODY MASS INDEX: 29.33 KG/M2 | WEIGHT: 145.2 LBS | HEART RATE: 130 BPM | TEMPERATURE: 98 F | DIASTOLIC BLOOD PRESSURE: 64 MMHG

## 2020-10-07 DIAGNOSIS — N95.0 PMB (POSTMENOPAUSAL BLEEDING): Primary | ICD-10-CM

## 2020-10-07 PROCEDURE — 99213 OFFICE O/P EST LOW 20 MIN: CPT | Performed by: OBSTETRICS & GYNECOLOGY

## 2020-10-07 PROCEDURE — 1036F TOBACCO NON-USER: CPT | Performed by: OBSTETRICS & GYNECOLOGY

## 2020-10-10 ENCOUNTER — HOSPITAL ENCOUNTER (OUTPATIENT)
Dept: ULTRASOUND IMAGING | Facility: HOSPITAL | Age: 50
Discharge: HOME | End: 2020-10-10
Attending: OBSTETRICS & GYNECOLOGY
Payer: COMMERCIAL

## 2020-10-10 DIAGNOSIS — N95.0 PMB (POSTMENOPAUSAL BLEEDING): ICD-10-CM

## 2020-10-10 PROCEDURE — 76856 US EXAM PELVIC COMPLETE: CPT

## 2020-10-10 PROCEDURE — 76830 TRANSVAGINAL US NON-OB: CPT

## 2020-10-20 ENCOUNTER — TELEPHONE (OUTPATIENT)
Dept: OBGYN CLINIC | Facility: CLINIC | Age: 50
End: 2020-10-20

## 2020-10-30 RX ORDER — BIMATOPROST 0.01 %
DROPS OPHTHALMIC (EYE)
COMMUNITY
Start: 2020-10-20

## 2020-11-04 ENCOUNTER — OFFICE VISIT (OUTPATIENT)
Dept: FAMILY MEDICINE CLINIC | Facility: CLINIC | Age: 50
End: 2020-11-04
Payer: COMMERCIAL

## 2020-11-04 VITALS
OXYGEN SATURATION: 96 % | WEIGHT: 146 LBS | DIASTOLIC BLOOD PRESSURE: 70 MMHG | SYSTOLIC BLOOD PRESSURE: 110 MMHG | HEART RATE: 88 BPM | TEMPERATURE: 97.9 F | BODY MASS INDEX: 29.43 KG/M2 | HEIGHT: 59 IN | RESPIRATION RATE: 16 BRPM

## 2020-11-04 DIAGNOSIS — E55.9 VITAMIN D DEFICIENCY: ICD-10-CM

## 2020-11-04 DIAGNOSIS — R63.5 WEIGHT GAIN: Primary | ICD-10-CM

## 2020-11-04 PROCEDURE — 99214 OFFICE O/P EST MOD 30 MIN: CPT | Performed by: FAMILY MEDICINE

## 2020-11-04 RX ORDER — PHENTERMINE HYDROCHLORIDE 15 MG/1
15 CAPSULE ORAL EVERY MORNING
Qty: 30 CAPSULE | Refills: 2 | Status: SHIPPED | OUTPATIENT
Start: 2020-11-04 | End: 2021-07-15 | Stop reason: ALTCHOICE

## 2020-11-04 RX ORDER — ERGOCALCIFEROL 1.25 MG/1
50000 CAPSULE ORAL WEEKLY
Qty: 4 CAPSULE | Refills: 5 | Status: SHIPPED | OUTPATIENT
Start: 2020-11-04 | End: 2021-07-15 | Stop reason: ALTCHOICE

## 2020-11-19 ENCOUNTER — HOSPITAL ENCOUNTER (OUTPATIENT)
Dept: MAMMOGRAPHY | Facility: CLINIC | Age: 50
Discharge: HOME | End: 2020-11-19
Payer: COMMERCIAL

## 2020-11-19 ENCOUNTER — OFFICE VISIT (OUTPATIENT)
Dept: FAMILY MEDICINE CLINIC | Facility: CLINIC | Age: 50
End: 2020-11-19
Payer: COMMERCIAL

## 2020-11-19 VITALS
RESPIRATION RATE: 16 BRPM | OXYGEN SATURATION: 97 % | TEMPERATURE: 97.9 F | WEIGHT: 140 LBS | BODY MASS INDEX: 28.22 KG/M2 | SYSTOLIC BLOOD PRESSURE: 130 MMHG | HEART RATE: 88 BPM | HEIGHT: 59 IN | DIASTOLIC BLOOD PRESSURE: 80 MMHG

## 2020-11-19 DIAGNOSIS — Z09 FOLLOW-UP EXAM, 3-6 MONTHS SINCE PREVIOUS EXAM: ICD-10-CM

## 2020-11-19 DIAGNOSIS — K64.1 GRADE II HEMORRHOIDS: Primary | ICD-10-CM

## 2020-11-19 PROCEDURE — 99213 OFFICE O/P EST LOW 20 MIN: CPT | Performed by: FAMILY MEDICINE

## 2020-11-19 PROCEDURE — G0279 TOMOSYNTHESIS, MAMMO: HCPCS

## 2020-11-19 PROCEDURE — 3008F BODY MASS INDEX DOCD: CPT | Performed by: OBSTETRICS & GYNECOLOGY

## 2020-11-19 PROCEDURE — 1036F TOBACCO NON-USER: CPT | Performed by: FAMILY MEDICINE

## 2020-11-19 PROCEDURE — 3008F BODY MASS INDEX DOCD: CPT | Performed by: FAMILY MEDICINE

## 2020-11-19 PROCEDURE — 77066 DX MAMMO INCL CAD BI: CPT

## 2020-11-19 RX ORDER — HYDROCORTISONE 25 MG/G
CREAM TOPICAL 2 TIMES DAILY
Qty: 30 G | Refills: 2 | Status: SHIPPED | OUTPATIENT
Start: 2020-11-19 | End: 2021-07-15 | Stop reason: ALTCHOICE

## 2020-12-03 ENCOUNTER — TELEPHONE (OUTPATIENT)
Dept: FAMILY MEDICINE CLINIC | Facility: CLINIC | Age: 50
End: 2020-12-03

## 2020-12-03 NOTE — TELEPHONE ENCOUNTER
LVM need to call office back for results  ----- Message from Dorsey Gosselin, MD sent at 12/2/2020 12:06 PM EST -----  Please call patient:    FINDINGS:   RIGHT  1) CALCIFICATIONS: There are round calcifications in a grouped distribution seen in the lower inner quadrant of the right breast in the anterior depth  Calcifications have a probably benign appearance and do not warrant biopsy at this time  Recommend continued six-month follow-up        LEFT  There are no suspicious masses, grouped microcalcifications or areas of architectural distortion  The skin and nipple areolar complex are unremarkable          IMPRESSION:     Stable   Appearance of the probably benign calcifications in the behind areola right breast   Continued six-month follow-up is recommended

## 2021-04-06 ENCOUNTER — TELEPHONE (OUTPATIENT)
Dept: OBGYN CLINIC | Facility: CLINIC | Age: 51
End: 2021-04-06

## 2021-04-12 ENCOUNTER — TELEPHONE (OUTPATIENT)
Dept: OBGYN CLINIC | Facility: CLINIC | Age: 51
End: 2021-04-12

## 2021-05-04 ENCOUNTER — TELEPHONE (OUTPATIENT)
Dept: FAMILY MEDICINE CLINIC | Facility: CLINIC | Age: 51
End: 2021-05-04

## 2021-05-10 ENCOUNTER — TELEPHONE (OUTPATIENT)
Dept: OBGYN CLINIC | Facility: CLINIC | Age: 51
End: 2021-05-10

## 2021-06-01 ENCOUNTER — TELEPHONE (OUTPATIENT)
Dept: OBGYN CLINIC | Facility: CLINIC | Age: 51
End: 2021-06-01

## 2021-06-01 NOTE — TELEPHONE ENCOUNTER
----- Message from Tea Cole sent at 5/7/2021  8:20 AM EDT -----    ----- Message -----  From: Alberto Squires MD  Sent: 4/5/2021   8:41 AM EDT  To: [address]    Pt never  had follow up to her US  It is reassuring with a normal endometrial lining and some small fibroids  No intervention is needed    Return if any symptoms; otherwise for annual exam     TY  ----- Message -----  From: Interface, Radiology Results In  Sent: 10/13/2020   7:07 PM EDT  To: Alberto Squires MD

## 2021-07-15 ENCOUNTER — HOSPITAL ENCOUNTER (OUTPATIENT)
Dept: RADIOLOGY | Facility: HOSPITAL | Age: 51
Discharge: HOME | End: 2021-07-15
Payer: COMMERCIAL

## 2021-07-15 ENCOUNTER — OFFICE VISIT (OUTPATIENT)
Dept: FAMILY MEDICINE CLINIC | Facility: CLINIC | Age: 51
End: 2021-07-15
Payer: COMMERCIAL

## 2021-07-15 VITALS
HEART RATE: 84 BPM | WEIGHT: 146 LBS | SYSTOLIC BLOOD PRESSURE: 110 MMHG | OXYGEN SATURATION: 96 % | HEIGHT: 59 IN | TEMPERATURE: 97.9 F | BODY MASS INDEX: 29.43 KG/M2 | DIASTOLIC BLOOD PRESSURE: 70 MMHG | RESPIRATION RATE: 16 BRPM

## 2021-07-15 DIAGNOSIS — M79.671 PAIN OF RIGHT HEEL: ICD-10-CM

## 2021-07-15 DIAGNOSIS — M72.2 PLANTAR FASCIITIS: Primary | ICD-10-CM

## 2021-07-15 DIAGNOSIS — Z12.31 ENCOUNTER FOR SCREENING MAMMOGRAM FOR MALIGNANT NEOPLASM OF BREAST: ICD-10-CM

## 2021-07-15 DIAGNOSIS — Q89.3 SITUS INVERSUS: ICD-10-CM

## 2021-07-15 DIAGNOSIS — J30.1 SEASONAL ALLERGIC RHINITIS DUE TO POLLEN: ICD-10-CM

## 2021-07-15 DIAGNOSIS — E28.319 MENOPAUSE PRAECOX: ICD-10-CM

## 2021-07-15 PROCEDURE — 73630 X-RAY EXAM OF FOOT: CPT

## 2021-07-15 PROCEDURE — 99213 OFFICE O/P EST LOW 20 MIN: CPT | Performed by: FAMILY MEDICINE

## 2021-07-15 RX ORDER — DICLOFENAC SODIUM 75 MG/1
75 TABLET, DELAYED RELEASE ORAL 2 TIMES DAILY
Qty: 20 TABLET | Refills: 0 | Status: SHIPPED | OUTPATIENT
Start: 2021-07-15

## 2021-07-15 RX ORDER — B-COMPLEX WITH VITAMIN C
1 TABLET ORAL 2 TIMES DAILY WITH MEALS
Qty: 60 TABLET | Refills: 5 | Status: SHIPPED | OUTPATIENT
Start: 2021-07-15

## 2021-07-15 RX ORDER — CETIRIZINE HYDROCHLORIDE, PSEUDOEPHEDRINE HYDROCHLORIDE 5; 120 MG/1; MG/1
1 TABLET, FILM COATED, EXTENDED RELEASE ORAL 2 TIMES DAILY
Qty: 30 TABLET | Refills: 0 | Status: SHIPPED | OUTPATIENT
Start: 2021-07-15

## 2021-07-15 NOTE — PROGRESS NOTES
Assessment/Plan:    No problem-specific Assessment & Plan notes found for this encounter  Diagnoses and all orders for this visit:    BMI 29 0-29 9,adult    Encounter for screening mammogram for malignant neoplasm of breast  -     Mammo diagnostic right w cad; Future    Situs inversus    Other orders  -     Cancel: CBC and differential; Future  -     Cancel: Comprehensive metabolic panel; Future  -     Cancel: Lipid panel; Future          Subjective:      Patient ID: Kristie Aguilar is a 53 y o  female  Knee Pain b/l, worse left side   Foot Pain          The following portions of the patient's history were reviewed and updated as appropriate: allergies, current medications, past family history, past medical history, past social history, past surgical history and problem list     Review of Systems   Constitutional: Negative for diaphoresis, fatigue, fever and unexpected weight change  Respiratory: Negative for cough, chest tightness, shortness of breath and wheezing  Cardiovascular: Negative for chest pain, palpitations and leg swelling  Gastrointestinal: Negative for abdominal pain, blood in stool and constipation  Neurological: Negative for dizziness, syncope, light-headedness and headaches  Hematological: Does not bruise/bleed easily  Psychiatric/Behavioral: Negative for behavioral problems, self-injury and sleep disturbance  The patient is not nervous/anxious  Objective:      /70 (BP Location: Left arm, Patient Position: Sitting, Cuff Size: Standard)   Pulse 84   Temp 97 9 °F (36 6 °C) (Temporal)   Resp 16   Ht 4' 11" (1 499 m)   Wt 66 2 kg (146 lb)   LMP 04/15/2019 (Approximate)   SpO2 96%   Breastfeeding No   BMI 29 49 kg/m²          Physical Exam  HENT:      Head: Normocephalic  Eyes:      Pupils: Pupils are equal, round, and reactive to light  Cardiovascular:      Rate and Rhythm: Normal rate and regular rhythm     Pulmonary:      Effort: Pulmonary effort is normal    Abdominal:      Palpations: Abdomen is soft  Musculoskeletal:         General: Normal range of motion  Cervical back: Neck supple  Skin:     General: Skin is warm and dry  Neurological:      Mental Status: She is alert  Psychiatric:         Behavior: Behavior normal          BMI Counseling: Body mass index is 29 49 kg/m²  The BMI is above normal  Nutrition recommendations include decreasing soda and/or juice intake, moderation in carbohydrate intake, increasing intake of lean protein, reducing intake of saturated fat and trans fat and reducing intake of cholesterol  Exercise recommendations include exercising 3-5 times per week

## 2021-08-05 ENCOUNTER — HOSPITAL ENCOUNTER (OUTPATIENT)
Dept: RADIOLOGY | Facility: HOSPITAL | Age: 51
Discharge: HOME | End: 2021-08-05
Payer: COMMERCIAL

## 2021-08-05 ENCOUNTER — OFFICE VISIT (OUTPATIENT)
Dept: FAMILY MEDICINE CLINIC | Facility: CLINIC | Age: 51
End: 2021-08-05
Payer: COMMERCIAL

## 2021-08-05 VITALS
HEART RATE: 78 BPM | TEMPERATURE: 98 F | SYSTOLIC BLOOD PRESSURE: 106 MMHG | DIASTOLIC BLOOD PRESSURE: 70 MMHG | WEIGHT: 143 LBS | RESPIRATION RATE: 16 BRPM | OXYGEN SATURATION: 96 % | BODY MASS INDEX: 28.83 KG/M2 | HEIGHT: 59 IN

## 2021-08-05 DIAGNOSIS — M25.562 ACUTE PAIN OF LEFT KNEE: ICD-10-CM

## 2021-08-05 DIAGNOSIS — Z00.01 ENCOUNTER FOR GENERAL ADULT MEDICAL EXAMINATION WITH ABNORMAL FINDINGS: Primary | ICD-10-CM

## 2021-08-05 DIAGNOSIS — Z23 NEED FOR TDAP VACCINATION: ICD-10-CM

## 2021-08-05 PROCEDURE — 90471 IMMUNIZATION ADMIN: CPT

## 2021-08-05 PROCEDURE — 90715 TDAP VACCINE 7 YRS/> IM: CPT

## 2021-08-05 PROCEDURE — 73562 X-RAY EXAM OF KNEE 3: CPT

## 2021-08-05 PROCEDURE — 1036F TOBACCO NON-USER: CPT | Performed by: FAMILY MEDICINE

## 2021-08-05 PROCEDURE — 3008F BODY MASS INDEX DOCD: CPT | Performed by: FAMILY MEDICINE

## 2021-08-05 PROCEDURE — 99396 PREV VISIT EST AGE 40-64: CPT | Performed by: FAMILY MEDICINE

## 2021-08-05 NOTE — PROGRESS NOTES
Assessment/Plan:  Patient is here for physical exam  I found patient without any distress  Patient has the following chronic conditions Arthritis  I  Recommended her to  exercise at least 3 1/2  hours per week and maintain a healthy diet with low carbohydrate and low saturated fat  Information about to search for healthy diet and exercise explained to patient  Patient understands the need for an annual physical exam      Left knee pain: Follow up with Ortho   The choice of NSAID's in this patient depends of her current pain syndrome  I explained to patient that response of NSAIDs varies between patient's and also is differs in regarding to the area of the body that is affected in the progression of the damage  The drug interactions and comorbidities affected  by these medications were explained to the patient also; the caution in toxicity in the GI tract was also discussed  Prevent the long-term use of these medications may be wise  The use ice and physical therapy is necessary in order to get the best results  NSAID's might elevate BP, or block effect of certain antihypertensive agents  It is to used with caution  Always use ice and therapy to decrease or avoid the need for a Pharmacological agent  No problem-specific Assessment & Plan notes found for this encounter  Diagnoses and all orders for this visit:    Need for Tdap vaccination  -     TDAP VACCINE GREATER THAN OR EQUAL TO 6YO IM    Encounter for general adult medical examination with abnormal findings  -     CBC and differential; Future  -     Comprehensive metabolic panel; Future  -     Lipid panel; Future    Acute pain of left knee  -     XR knee 3 vw left non injury; Future          Subjective:      Patient ID: Taye Cotton is a 53 y o  female  HPI  Patient is here for PE, not having any acute distress      The following portions of the patient's history were reviewed and updated as appropriate: allergies, current medications, past family history, past medical history, past social history, past surgical history and problem list     Review of Systems   Constitutional: Negative for diaphoresis, fatigue, fever and unexpected weight change  Respiratory: Negative for cough, chest tightness, shortness of breath and wheezing  Cardiovascular: Negative for chest pain, palpitations and leg swelling  Gastrointestinal: Negative for abdominal pain, blood in stool and constipation  Musculoskeletal: Positive for arthralgias (knee pain past 3 months worsening)  Neurological: Negative for dizziness, syncope, light-headedness and headaches  Hematological: Does not bruise/bleed easily  Psychiatric/Behavioral: Negative for behavioral problems, self-injury and sleep disturbance  The patient is not nervous/anxious  Objective:      /70 (BP Location: Left arm, Patient Position: Sitting, Cuff Size: Standard)   Pulse 78   Temp 98 °F (36 7 °C) (Temporal)   Resp 16   Ht 4' 11" (1 499 m)   Wt 64 9 kg (143 lb)   LMP 04/15/2019 (Approximate)   SpO2 96%   Breastfeeding No   BMI 28 88 kg/m²          Physical Exam  HENT:      Head: Normocephalic  Eyes:      Pupils: Pupils are equal, round, and reactive to light  Cardiovascular:      Rate and Rhythm: Normal rate and regular rhythm  Pulmonary:      Effort: Pulmonary effort is normal    Musculoskeletal:         General: Tenderness (medial area of left knee ) present  Cervical back: Neck supple  Skin:     General: Skin is warm and dry  Neurological:      Mental Status: She is alert     Psychiatric:         Behavior: Behavior normal

## 2021-08-06 ENCOUNTER — APPOINTMENT (OUTPATIENT)
Dept: LAB | Facility: HOSPITAL | Age: 51
End: 2021-08-06
Payer: COMMERCIAL

## 2021-08-20 ENCOUNTER — APPOINTMENT (OUTPATIENT)
Dept: RADIOLOGY | Facility: MEDICAL CENTER | Age: 51
End: 2021-08-20
Payer: COMMERCIAL

## 2021-08-20 ENCOUNTER — CONSULT (OUTPATIENT)
Dept: OBGYN CLINIC | Facility: MEDICAL CENTER | Age: 51
End: 2021-08-20
Payer: COMMERCIAL

## 2021-08-20 VITALS
SYSTOLIC BLOOD PRESSURE: 103 MMHG | HEIGHT: 59 IN | BODY MASS INDEX: 29.03 KG/M2 | HEART RATE: 71 BPM | WEIGHT: 144 LBS | DIASTOLIC BLOOD PRESSURE: 69 MMHG

## 2021-08-20 DIAGNOSIS — M25.562 ACUTE PAIN OF LEFT KNEE: ICD-10-CM

## 2021-08-20 DIAGNOSIS — M17.12 ARTHRITIS OF LEFT KNEE: ICD-10-CM

## 2021-08-20 DIAGNOSIS — M17.12 ARTHRITIS OF LEFT KNEE: Primary | ICD-10-CM

## 2021-08-20 PROCEDURE — 99243 OFF/OP CNSLTJ NEW/EST LOW 30: CPT | Performed by: ORTHOPAEDIC SURGERY

## 2021-08-20 PROCEDURE — 73560 X-RAY EXAM OF KNEE 1 OR 2: CPT

## 2021-08-20 PROCEDURE — 1036F TOBACCO NON-USER: CPT | Performed by: ORTHOPAEDIC SURGERY

## 2021-08-20 PROCEDURE — 3008F BODY MASS INDEX DOCD: CPT | Performed by: ORTHOPAEDIC SURGERY

## 2021-08-20 NOTE — LETTER
August 20, 2021     Nayana Stoddard MD  [address]  [address]  [address] [address]    Patient: Larry Monreal   YOB: 1970   Date of Visit: 8/20/2021       Dear Dr Derrell Willoughby: Thank you for referring Larry Monreal to me for evaluation  Below are my notes for this consultation  If you have questions, please do not hesitate to call me  I look forward to following your patient along with you  Sincerely,        Liana Salinas DO        CC: No Recipients  Liana Salinas DO  8/20/2021  3:36 PM  Sign when Signing Visit   Assessment/Plan     1  Arthritis of left knee    2  Acute pain of left knee      Orders Placed This Encounter   Procedures    XR knee 1 or 2 vw left    Ambulatory referral to Physical Therapy     · Patient has mild left knee osteoarthritis  · Discussed with patient conservative treatments: steroid injections, physical therapy, medications,   Prescribed patient Naprosyn 500 mg, medications warnings were reviewed with patient  · Physical therapy order was given out today   · If pain persists, consider left knee CSI vs ordering MRI left knee at the next office visit  Return in about 6 weeks (around 10/1/2021) for Recheck left knee   I answered all of the patient's questions during the visit and provided education of the patient's condition during the visit  The patient verbalized understanding of the information given and agrees with the plan  This note was dictated using [employer] software  It may contain errors including improperly dictated words  Please contact physician directly for any questions  History of Present Illness   Chief complaint:   Chief Complaint   Patient presents with    Left Knee - Pain       HPI: Larry Monreal is a 53 y o  female that c/o left knee pain  She was for by her PCP Dr Camacho  she is Mohawk-speaking only and translation was provided for her today for the visit    Patient states she has been having left knee pain for two years off and on and progressively been getting worse, denies any falls or trauma  She states she is having a constant achy pain over the anterior medial aspect the left knee  She does deny instability and locking  Pain is worse with standing, walking, and going down steps  Pain is better at rest   She tried taking diclofenac 75 mg as needed for pain with no relief  She has no history having injection, physical therapy, or surgeries on the left knee  She has been fully vaccinated for COVID  ROS:    See HPI for musculoskeletal review  All other systems reviewed are negative     Historical Information   Past Medical History:   Diagnosis Date    Anemia     Situs inversus      No past surgical history on file    Social History   Social History     Substance and Sexual Activity   Alcohol Use Yes     Social History     Substance and Sexual Activity   Drug Use No     Social History     Tobacco Use   Smoking Status Never Smoker   Smokeless Tobacco Never Used     Family History:   Family History   Problem Relation Age of Onset    No Known Problems Mother     No Known Problems Father     No Known Problems Daughter     No Known Problems Maternal Grandmother     No Known Problems Maternal Grandfather     No Known Problems Paternal Grandmother     No Known Problems Paternal Grandfather     No Known Problems Brother     No Known Problems Brother     No Known Problems Brother     No Known Problems Brother        Current Outpatient Medications on File Prior to Visit   Medication Sig Dispense Refill    calcium carbonate-vitamin D (OSCAL-D) 500 mg-200 units per tablet Take 1 tablet by mouth 2 (two) times a day with meals 60 tablet 5    cetirizine-pseudoephedrine (ZyrTEC-D) 5-120 MG per tablet Take 1 tablet by mouth 2 (two) times a day 30 tablet 0    diclofenac (VOLTAREN) 75 mg EC tablet Take 1 tablet (75 mg total) by mouth 2 (two) times a day 20 tablet 0    Lumigan 0 01 % ophthalmic drops        No current facility-administered medications on file prior to visit  Allergies   Allergen Reactions    No Active Allergies        Current Outpatient Medications on File Prior to Visit   Medication Sig Dispense Refill    calcium carbonate-vitamin D (OSCAL-D) 500 mg-200 units per tablet Take 1 tablet by mouth 2 (two) times a day with meals 60 tablet 5    cetirizine-pseudoephedrine (ZyrTEC-D) 5-120 MG per tablet Take 1 tablet by mouth 2 (two) times a day 30 tablet 0    diclofenac (VOLTAREN) 75 mg EC tablet Take 1 tablet (75 mg total) by mouth 2 (two) times a day 20 tablet 0    Lumigan 0 01 % ophthalmic drops        No current facility-administered medications on file prior to visit  Objective   Vitals: Blood pressure 103/69, pulse 71, height 4' 11" (1 499 m), weight 65 3 kg (144 lb), last menstrual period 04/15/2019, not currently breastfeeding  ,Body mass index is 29 08 kg/m²      PE:  AAOx 3  WDWN  Hearing intact, no drainage from eyes  Regular rate  no audible wheezing  no abdominal distension  LE compartments soft, skin intact    leftknee:    Appearance:  no swelling   No ecchymosis  no obvious joint deformity   Mild  effusion  Palpation/Tenderness:  +TTP over medial joint line  No TTP over lateral joint line   No TTP over patella  No TTP over patellar tendon  No TTP over pes anserine bursa  Active Range of Motion:  AROM: full  Special Tests:  Medial Chandler's Test:  Negative   Lateral Chandler's Test:  Negative  Apley's compression test:  Negative  Lachman's Test:  negative  Anterior Drawer Test:  Negative  Patellar grind:  Negative  Valgus Stress Test:  negative  Varus Stress Test:  negative     No ipsilateral hip pain with ROM    leftLE:    Sensation grossly intact L4, L5,S1   Palpable pedal   pulse  AT/GS/EHL intact    Imaging Studies: I have personally reviewed pertinent films in PACS   XR leftknee:  Mild DJD       Scribe Attestation    I,:  Tonia Moctezuma am acting as a scribe while in the presence of the attending physician :       I,:  Zack Chino DO personally performed the services described in this documentation    as scribed in my presence :               Scribe Attestation    I,:  Enrike Sheikh am acting as a scribe while in the presence of the attending physician :       I,:  Zack Chino DO personally performed the services described in this documentation    as scribed in my presence :

## 2021-08-20 NOTE — PROGRESS NOTES
Assessment/Plan     1  Arthritis of left knee    2  Acute pain of left knee      Orders Placed This Encounter   Procedures    XR knee 1 or 2 vw left    Ambulatory referral to Physical Therapy     · Patient has mild left knee osteoarthritis  · Discussed with patient conservative treatments: steroid injections, physical therapy, medications,   Prescribed patient Naprosyn 500 mg, medications warnings were reviewed with patient  · Physical therapy order was given out today   · If pain persists, consider left knee CSI vs ordering MRI left knee at the next office visit  Return in about 6 weeks (around 10/1/2021) for Recheck left knee   I answered all of the patient's questions during the visit and provided education of the patient's condition during the visit  The patient verbalized understanding of the information given and agrees with the plan  This note was dictated using [employer] software  It may contain errors including improperly dictated words  Please contact physician directly for any questions  History of Present Illness   Chief complaint:   Chief Complaint   Patient presents with    Left Knee - Pain       HPI: Sonal Ramos is a 53 y o  female that c/o left knee pain  She was for by her PCP Dr Camacho  she is French-speaking only and translation was provided for her today for the visit  Patient states she has been having left knee pain for two years off and on and progressively been getting worse, denies any falls or trauma  She states she is having a constant achy pain over the anterior medial aspect the left knee  She does deny instability and locking  Pain is worse with standing, walking, and going down steps  Pain is better at rest   She tried taking diclofenac 75 mg as needed for pain with no relief  She has no history having injection, physical therapy, or surgeries on the left knee  She has been fully vaccinated for COVID  ROS:    See HPI for musculoskeletal review     All other systems reviewed are negative     Historical Information   Past Medical History:   Diagnosis Date    Anemia     Situs inversus      No past surgical history on file  Social History   Social History     Substance and Sexual Activity   Alcohol Use Yes     Social History     Substance and Sexual Activity   Drug Use No     Social History     Tobacco Use   Smoking Status Never Smoker   Smokeless Tobacco Never Used     Family History:   Family History   Problem Relation Age of Onset    No Known Problems Mother     No Known Problems Father     No Known Problems Daughter     No Known Problems Maternal Grandmother     No Known Problems Maternal Grandfather     No Known Problems Paternal Grandmother     No Known Problems Paternal Grandfather     No Known Problems Brother     No Known Problems Brother     No Known Problems Brother     No Known Problems Brother        Current Outpatient Medications on File Prior to Visit   Medication Sig Dispense Refill    calcium carbonate-vitamin D (OSCAL-D) 500 mg-200 units per tablet Take 1 tablet by mouth 2 (two) times a day with meals 60 tablet 5    cetirizine-pseudoephedrine (ZyrTEC-D) 5-120 MG per tablet Take 1 tablet by mouth 2 (two) times a day 30 tablet 0    diclofenac (VOLTAREN) 75 mg EC tablet Take 1 tablet (75 mg total) by mouth 2 (two) times a day 20 tablet 0    Lumigan 0 01 % ophthalmic drops        No current facility-administered medications on file prior to visit       Allergies   Allergen Reactions    No Active Allergies        Current Outpatient Medications on File Prior to Visit   Medication Sig Dispense Refill    calcium carbonate-vitamin D (OSCAL-D) 500 mg-200 units per tablet Take 1 tablet by mouth 2 (two) times a day with meals 60 tablet 5    cetirizine-pseudoephedrine (ZyrTEC-D) 5-120 MG per tablet Take 1 tablet by mouth 2 (two) times a day 30 tablet 0    diclofenac (VOLTAREN) 75 mg EC tablet Take 1 tablet (75 mg total) by mouth 2 (two) times a day 20 tablet 0    Lumigan 0 01 % ophthalmic drops        No current facility-administered medications on file prior to visit  Objective   Vitals: Blood pressure 103/69, pulse 71, height 4' 11" (1 499 m), weight 65 3 kg (144 lb), last menstrual period 04/15/2019, not currently breastfeeding  ,Body mass index is 29 08 kg/m²      PE:  AAOx 3  WDWN  Hearing intact, no drainage from eyes  Regular rate  no audible wheezing  no abdominal distension  LE compartments soft, skin intact    leftknee:    Appearance:  no swelling   No ecchymosis  no obvious joint deformity   Mild  effusion  Palpation/Tenderness:  +TTP over medial joint line  No TTP over lateral joint line   No TTP over patella  No TTP over patellar tendon  No TTP over pes anserine bursa  Active Range of Motion:  AROM: full  Special Tests:  Medial Chandler's Test:  Negative   Lateral Chandler's Test:  Negative  Apley's compression test:  Negative  Lachman's Test:  negative  Anterior Drawer Test:  Negative  Patellar grind:  Negative  Valgus Stress Test:  negative  Varus Stress Test:  negative     No ipsilateral hip pain with ROM    leftLE:    Sensation grossly intact L4, L5,S1   Palpable pedal   pulse  AT/GS/EHL intact    Imaging Studies: I have personally reviewed pertinent films in PACS   XR leftknee:  Mild DJD       Scribe Attestation    I,:  Mirna Moctezuma am acting as a scribe while in the presence of the attending physician :       I,:  Iwona Cheng DO personally performed the services described in this documentation    as scribed in my presence :               Scribe Attestation    I,:  Evelyn Kirk am acting as a scribe while in the presence of the attending physician :       I,:  Iwona Cheng DO personally performed the services described in this documentation    as scribed in my presence :

## 2021-08-23 NOTE — RESULT ENCOUNTER NOTE
Please call patient  She needs to take acetaminophen and vitamin d with calcium it not enough relief to go for patient

## 2021-08-26 ENCOUNTER — TELEPHONE (OUTPATIENT)
Dept: FAMILY MEDICINE CLINIC | Facility: CLINIC | Age: 51
End: 2021-08-26

## 2021-08-26 NOTE — TELEPHONE ENCOUNTER
Call pt  LVM need to call office back for results  ----- Message from Griselda Oas, MD sent at 8/23/2021  4:10 PM EDT -----  Please call patient  She needs to take acetaminophen and vitamin d with calcium it not enough relief to go for patient

## 2021-09-02 ENCOUNTER — HOSPITAL ENCOUNTER (OUTPATIENT)
Dept: MAMMOGRAPHY | Facility: CLINIC | Age: 51
Discharge: HOME | End: 2021-09-02
Payer: COMMERCIAL

## 2021-09-02 VITALS — BODY MASS INDEX: 29.03 KG/M2 | HEIGHT: 59 IN | WEIGHT: 144 LBS

## 2021-09-02 DIAGNOSIS — Z12.31 ENCOUNTER FOR SCREENING MAMMOGRAM FOR MALIGNANT NEOPLASM OF BREAST: ICD-10-CM

## 2021-09-02 DIAGNOSIS — R92.1 BREAST CALCIFICATIONS: ICD-10-CM

## 2021-09-02 PROCEDURE — 77065 DX MAMMO INCL CAD UNI: CPT

## 2022-06-22 ENCOUNTER — OFFICE VISIT (OUTPATIENT)
Dept: FAMILY MEDICINE CLINIC | Facility: CLINIC | Age: 52
End: 2022-06-22
Payer: COMMERCIAL

## 2022-06-22 VITALS
HEIGHT: 59 IN | HEART RATE: 80 BPM | OXYGEN SATURATION: 96 % | RESPIRATION RATE: 16 BRPM | BODY MASS INDEX: 29.43 KG/M2 | WEIGHT: 146 LBS | DIASTOLIC BLOOD PRESSURE: 70 MMHG | TEMPERATURE: 97.9 F | SYSTOLIC BLOOD PRESSURE: 126 MMHG

## 2022-06-22 DIAGNOSIS — R42 VERTIGO: Primary | ICD-10-CM

## 2022-06-22 DIAGNOSIS — H65.192 OTHER NON-RECURRENT ACUTE NONSUPPURATIVE OTITIS MEDIA OF LEFT EAR: ICD-10-CM

## 2022-06-22 DIAGNOSIS — R92.1 BREAST CALCIFICATION, RIGHT: ICD-10-CM

## 2022-06-22 DIAGNOSIS — Z12.11 COLON CANCER SCREENING: ICD-10-CM

## 2022-06-22 DIAGNOSIS — Q89.3 SITUS INVERSUS: ICD-10-CM

## 2022-06-22 DIAGNOSIS — Z12.31 ENCOUNTER FOR SCREENING MAMMOGRAM FOR MALIGNANT NEOPLASM OF BREAST: ICD-10-CM

## 2022-06-22 PROCEDURE — 3008F BODY MASS INDEX DOCD: CPT | Performed by: FAMILY MEDICINE

## 2022-06-22 PROCEDURE — 1036F TOBACCO NON-USER: CPT | Performed by: FAMILY MEDICINE

## 2022-06-22 PROCEDURE — 99214 OFFICE O/P EST MOD 30 MIN: CPT | Performed by: FAMILY MEDICINE

## 2022-06-22 PROCEDURE — 3725F SCREEN DEPRESSION PERFORMED: CPT | Performed by: FAMILY MEDICINE

## 2022-06-22 RX ORDER — MECLIZINE HCL 12.5 MG/1
12.5 TABLET ORAL EVERY 8 HOURS PRN
Qty: 30 TABLET | Refills: 0 | Status: SHIPPED | OUTPATIENT
Start: 2022-06-22

## 2022-06-22 RX ORDER — AZITHROMYCIN 250 MG/1
TABLET, FILM COATED ORAL
Qty: 6 TABLET | Refills: 0 | Status: SHIPPED | OUTPATIENT
Start: 2022-06-22 | End: 2022-06-27

## 2022-06-22 RX ORDER — DICLOFENAC POTASSIUM 50 MG/1
50 TABLET, FILM COATED ORAL 3 TIMES DAILY
Qty: 30 TABLET | Refills: 0 | Status: SHIPPED | OUTPATIENT
Start: 2022-06-22

## 2022-06-22 NOTE — PROGRESS NOTES
Assessment/Plan:      Diagnoses and all orders for this visit:    Vertigo  -     meclizine (ANTIVERT) 12 5 MG tablet; Take 1 tablet (12 5 mg total) by mouth every 8 (eight) hours as needed for dizziness    Colon cancer screening  -     Cancel: IRIS Diabetic eye exam  -     Ambulatory referral to General Surgery; Future    Other non-recurrent acute nonsuppurative otitis media of left ear  -     azithromycin (Zithromax) 250 mg tablet; Take 2 tablets (500 mg total) by mouth daily for 1 day, THEN 1 tablet (250 mg total) daily for 4 days  -     diclofenac potassium (CATAFLAM) 50 mg tablet; Take 1 tablet (50 mg total) by mouth 3 (three) times a day    Encounter for screening mammogram for malignant neoplasm of breast    Situs inversus    Breast calcification, right  -     Mammo diagnostic bilateral w 3d & cad; Future          Subjective:     Patient ID: Edgardo Faulkner is a 49 y o  female  HPI  Dizziness: lying downrotary vertigo patient was told about her own history of non-contributory    Review of Systems   Constitutional: Negative for diaphoresis, fatigue, fever and unexpected weight change  Respiratory: Negative for cough, chest tightness, shortness of breath and wheezing  Cardiovascular: Negative for chest pain, palpitations and leg swelling  Gastrointestinal: Negative for abdominal pain, blood in stool and constipation  Neurological: Negative for dizziness, syncope, light-headedness and headaches  Hematological: Does not bruise/bleed easily  Psychiatric/Behavioral: Negative for behavioral problems, self-injury and sleep disturbance  The patient is not nervous/anxious  Objective:  Enc Vitals  Blood Pressure: 126/70  Pulse: 80  Respirations: 16  Temperature: 97 9 °F (36 6 °C)  Temp Source: Temporal  SpO2: 96 %  Weight - Scale: 66 2 kg (146 lb)  Height: 4' 11" (149 9 cm)  Pain Score: 0-No pain     Physical Exam  Cardiovascular:      Rate and Rhythm: Normal rate and regular rhythm     Pulmonary: Effort: Pulmonary effort is normal       Breath sounds: Normal breath sounds  Musculoskeletal:         General: Normal range of motion  Cervical back: Neck supple  Neurological:      General: No focal deficit present  Mental Status: She is alert and oriented to person, place, and time     Psychiatric:         Behavior: Behavior normal

## 2022-08-09 RX ORDER — SODIUM CHLORIDE 9 MG/ML
125 INJECTION, SOLUTION INTRAVENOUS CONTINUOUS
Status: CANCELLED | OUTPATIENT
Start: 2022-08-09

## 2022-08-10 ENCOUNTER — HOSPITAL ENCOUNTER (OUTPATIENT)
Dept: GASTROENTEROLOGY | Facility: HOSPITAL | Age: 52
Setting detail: OUTPATIENT SURGERY
Discharge: HOME | End: 2022-08-10
Attending: SURGERY | Admitting: SURGERY
Payer: COMMERCIAL

## 2022-08-10 ENCOUNTER — TELEPHONE (OUTPATIENT)
Dept: OTHER | Facility: OTHER | Age: 52
End: 2022-08-10

## 2022-08-10 ENCOUNTER — ANESTHESIA EVENT (OUTPATIENT)
Dept: GASTROENTEROLOGY | Facility: HOSPITAL | Age: 52
End: 2022-08-10

## 2022-08-10 ENCOUNTER — ANESTHESIA (OUTPATIENT)
Dept: GASTROENTEROLOGY | Facility: HOSPITAL | Age: 52
End: 2022-08-10

## 2022-08-10 VITALS
WEIGHT: 146 LBS | OXYGEN SATURATION: 99 % | HEIGHT: 59 IN | TEMPERATURE: 97.3 F | BODY MASS INDEX: 29.43 KG/M2 | HEART RATE: 61 BPM | SYSTOLIC BLOOD PRESSURE: 97 MMHG | DIASTOLIC BLOOD PRESSURE: 69 MMHG | RESPIRATION RATE: 16 BRPM

## 2022-08-10 DIAGNOSIS — Z12.11 ENCOUNTER FOR SCREENING FOR MALIGNANT NEOPLASM OF COLON: ICD-10-CM

## 2022-08-10 RX ORDER — SODIUM CHLORIDE 9 MG/ML
125 INJECTION, SOLUTION INTRAVENOUS CONTINUOUS
Status: DISCONTINUED | OUTPATIENT
Start: 2022-08-10 | End: 2022-08-14 | Stop reason: HOSPADM

## 2022-08-10 RX ORDER — PROPOFOL 10 MG/ML
INJECTION, EMULSION INTRAVENOUS AS NEEDED
Status: DISCONTINUED | OUTPATIENT
Start: 2022-08-10 | End: 2022-08-10

## 2022-08-10 RX ADMIN — PROPOFOL 50 MG: 10 INJECTION, EMULSION INTRAVENOUS at 08:52

## 2022-08-10 RX ADMIN — SODIUM CHLORIDE 125 ML/HR: 0.9 INJECTION, SOLUTION INTRAVENOUS at 07:16

## 2022-08-10 RX ADMIN — PROPOFOL 30 MG: 10 INJECTION, EMULSION INTRAVENOUS at 08:57

## 2022-08-10 RX ADMIN — PROPOFOL 50 MG: 10 INJECTION, EMULSION INTRAVENOUS at 08:47

## 2022-08-10 RX ADMIN — SODIUM CHLORIDE: 0.9 INJECTION, SOLUTION INTRAVENOUS at 08:42

## 2022-08-10 RX ADMIN — PROPOFOL 100 MG: 10 INJECTION, EMULSION INTRAVENOUS at 08:45

## 2022-08-10 NOTE — ANESTHESIA POSTPROCEDURE EVALUATION
Post-Op Assessment Note    CV Status:  Stable  Pain Score: 0    Pain management: adequate     Mental Status:  Alert and awake   Hydration Status:  Euvolemic   PONV Controlled:  Controlled   Airway Patency:  Patent      Post Op Vitals Reviewed: Yes      Staff: Anesthesiologist, CRNA         No complications documented      BP   96/66   Temp     Pulse  65   Resp   14   SpO2   98

## 2022-08-10 NOTE — INTERVAL H&P NOTE
H&P reviewed  After examining the patient I find no changes in the patients condition since the H&P had been written      Vitals:    08/10/22 0659   BP: 105/76   Pulse: 75   Resp: 20   Temp: (!) 96 9 °F (36 1 °C)   SpO2: 96%

## 2022-08-10 NOTE — H&P
H&P Exam - General Surgery   Chepe Melchor 49 y o  female MRN: [id number]  Unit/Bed#:  Encounter: [id number]    Assessment/Plan     Assessment:  Screening colonoscopy  Plan:  Colonoscopy    History of Present Illness   History, ROS and PFSH unobtainable from any source due to none  HPI:  Chepe Melchor is a 49 y o  female who presents with no complaints  Presents for screening colonoscopy  This is the patient's 1st colonoscopy       Review of Systems   All other systems reviewed and are negative        Historical Information   Past Medical History:   Diagnosis Date    Anemia     Situs inversus      Past Surgical History:   Procedure Laterality Date     SECTION      NASAL SINUS SURGERY       Social History   Social History     Substance and Sexual Activity   Alcohol Use Yes     Social History     Substance and Sexual Activity   Drug Use No     Social History     Tobacco Use   Smoking Status Never Smoker   Smokeless Tobacco Never Used     E-Cigarette/Vaping    E-Cigarette Use Never User      E-Cigarette/Vaping Substances     Family History: non-contributory    Meds/Allergies   all medications and allergies reviewed  Allergies   Allergen Reactions    No Active Allergies        Objective   First Vitals:   Blood Pressure: 105/76 (08/10/22 0659)  Pulse: 75 (08/10/22 0659)  Temperature: (!) 96 9 °F (36 1 °C) (08/10/22 0659)  Temp Source: Temporal (08/10/22 0659)  Respirations: 20 (08/10/22 0659)  Height: 4' 11" (149 9 cm) (08/10/22 0706)  Weight - Scale: 66 2 kg (146 lb) (08/10/22 0706)  SpO2: 96 % (08/10/22 0659)    Current Vitals:   Blood Pressure: 105/76 (08/10/22 0659)  Pulse: 75 (08/10/22 0659)  Temperature: (!) 96 9 °F (36 1 °C) (08/10/22 0659)  Temp Source: Temporal (08/10/22 0659)  Respirations: 20 (08/10/22 0659)  Height: 4' 11" (149 9 cm) (08/10/22 0706)  Weight - Scale: 66 2 kg (146 lb) (08/10/22 0706)  SpO2: 96 % (08/10/22 0659)    No intake or output data in the 24 hours ending 08/10/22 4850    Invasive Devices  Report    Peripheral Intravenous Line  Duration           Peripheral IV 08/10/22 Right Hand <1 day                Physical Exam  Vitals reviewed  Constitutional:       Appearance: Normal appearance  Cardiovascular:      Heart sounds: Normal heart sounds  Pulmonary:      Breath sounds: Normal breath sounds  Abdominal:      Palpations: Abdomen is soft  Tenderness: There is no abdominal tenderness  Musculoskeletal:         General: Normal range of motion  Skin:     General: Skin is warm and dry  Neurological:      General: No focal deficit present  Mental Status: She is alert and oriented to person, place, and time  Psychiatric:         Mood and Affect: Mood normal          Behavior: Behavior normal          Thought Content: Thought content normal          Judgment: Judgment normal          Lab Results: I have personally reviewed pertinent lab results  Imaging: I have personally reviewed pertinent reports  EKG, Pathology, and Other Studies: I have personally reviewed pertinent reports  Code Status: No Order  Advance Directive and Living Will:      Power of :    POLST:      Counseling / Coordination of Care  Total floor / unit time spent today 20 minutes  Greater than 50% of total time was spent with the patient and / or family counseling and / or coordination of care  A description of the counseling / coordination of care:  Colonoscopy

## 2022-08-10 NOTE — TELEPHONE ENCOUNTER
Patient called and canceled her appointment because she is unable to make the appointment, patient is asking if the office can give her back a call to reschedule her appointment

## 2022-08-10 NOTE — ANESTHESIA PREPROCEDURE EVALUATION
Procedure:  COLONOSCOPY    Relevant Problems   ANESTHESIA (within normal limits)   (-) History of anesthesia complications      CARDIO   (+) Pure hypercholesterolemia   (-) Chest pain   (-) ROBERSON (dyspnea on exertion)      PULMONARY   (-) Shortness of breath   (-) URI (upper respiratory infection)        Physical Exam    Airway    Mallampati score: I  TM Distance: >3 FB  Neck ROM: full     Dental       Cardiovascular      Pulmonary      Other Findings        Anesthesia Plan  ASA Score- 1     Anesthesia Type- IV sedation with anesthesia with ASA Monitors  Additional Monitors:   Airway Plan:           Plan Factors-Exercise tolerance (METS): >4 METS  Chart reviewed  Patient summary reviewed  Induction- intravenous  Postoperative Plan-     Informed Consent- Anesthetic plan and risks discussed with patient  I personally reviewed this patient with the CRNA  Discussed and agreed on the Anesthesia Plan with the CRNA  Randy Patel

## 2022-09-19 ENCOUNTER — TELEPHONE (OUTPATIENT)
Dept: OTHER | Facility: OTHER | Age: 52
End: 2022-09-19

## 2022-09-19 NOTE — TELEPHONE ENCOUNTER
Patient called in; needs to reschedule OV on 9/22  No appt notes; patient stated it is for a physical but unsure   Please contact patient to reschedule in October; unable to find any appointments with provider for after 3:30pm per patient request

## 2022-11-23 ENCOUNTER — OFFICE VISIT (OUTPATIENT)
Dept: FAMILY MEDICINE CLINIC | Facility: CLINIC | Age: 52
End: 2022-11-23

## 2022-11-23 VITALS
DIASTOLIC BLOOD PRESSURE: 70 MMHG | HEIGHT: 59 IN | TEMPERATURE: 97.9 F | WEIGHT: 142 LBS | SYSTOLIC BLOOD PRESSURE: 110 MMHG | HEART RATE: 80 BPM | RESPIRATION RATE: 16 BRPM | OXYGEN SATURATION: 98 % | BODY MASS INDEX: 28.63 KG/M2

## 2022-11-23 DIAGNOSIS — E55.9 VITAMIN D DEFICIENCY: ICD-10-CM

## 2022-11-23 DIAGNOSIS — Z11.59 SCREENING FOR VIRAL DISEASE: ICD-10-CM

## 2022-11-23 DIAGNOSIS — Z23 NEEDS FLU SHOT: ICD-10-CM

## 2022-11-23 DIAGNOSIS — Z00.01 ENCOUNTER FOR GENERAL ADULT MEDICAL EXAMINATION WITH ABNORMAL FINDINGS: Primary | ICD-10-CM

## 2022-11-23 NOTE — PROGRESS NOTES
Name: Atif Gonzalez      : 1970      MRN: [id number]  Encounter Provider: Leticia Pascual MD  Encounter Date: 2022   Encounter department:   [facility] [place] [address]     1  Encounter for general adult medical examination with abnormal findings  -     CBC and differential; Future  -     Comprehensive metabolic panel; Future  -     Lipid panel; Future    2  Needs flu shot  -     influenza vaccine, quadrivalent, recombinant, PF, 0 5 mL, for patients 18 yr+ (FLUBLOK)    3  BMI 28 0-28 9,adult    4  Screening for viral disease  -     Hepatitis B surface antibody; Future    5  Vitamin D deficiency  -     Vitamin D 25 hydroxy; Future; Expected date: 2022           Subjective      HPI  Review of Systems   Constitutional: Negative for diaphoresis, fatigue, fever and unexpected weight change  Respiratory: Negative for cough, chest tightness, shortness of breath and wheezing  Cardiovascular: Negative for chest pain, palpitations and leg swelling  Gastrointestinal: Negative for abdominal pain, blood in stool and constipation  Neurological: Negative for dizziness, syncope, light-headedness and headaches  Hematological: Does not bruise/bleed easily  Psychiatric/Behavioral: Negative for behavioral problems, self-injury and sleep disturbance  The patient is not nervous/anxious          Current Outpatient Medications on File Prior to Visit   Medication Sig   • calcium carbonate-vitamin D (OSCAL-D) 500 mg-200 units per tablet Take 1 tablet by mouth 2 (two) times a day with meals   • Cholecalciferol 25 MCG (1000 UT) tablet Take 1,000 Units by mouth daily   • meclizine (ANTIVERT) 12 5 MG tablet Take 1 tablet (12 5 mg total) by mouth every 8 (eight) hours as needed for dizziness       Objective     /70 (BP Location: Left arm, Patient Position: Sitting, Cuff Size: Standard)   Pulse 80   Temp 97 9 °F (36 6 °C) (Temporal)   Resp 16   Ht 4' 11" (1 499 m)   Wt 64 4 kg (142 lb)   LMP 04/15/2019 (Approximate)   SpO2 98%   BMI 28 68 kg/m²     Physical Exam  Cardiovascular:      Rate and Rhythm: Normal rate and regular rhythm  Pulmonary:      Effort: Pulmonary effort is normal       Breath sounds: Normal breath sounds  Musculoskeletal:         General: Normal range of motion  Cervical back: Neck supple  Neurological:      General: No focal deficit present  Mental Status: She is alert and oriented to person, place, and time  Psychiatric:         Behavior: Behavior normal        Jahaira Orourke MD  BMI Counseling: Body mass index is 28 68 kg/m²  The BMI is above normal  Nutrition recommendations include reducing fast food intake  Exercise recommendations include exercising 3-5 times per week

## 2022-11-28 ENCOUNTER — APPOINTMENT (OUTPATIENT)
Dept: LAB | Facility: HOSPITAL | Age: 52
End: 2022-11-28

## 2022-11-28 DIAGNOSIS — E55.9 VITAMIN D DEFICIENCY: ICD-10-CM

## 2022-11-28 DIAGNOSIS — Z00.01 ENCOUNTER FOR GENERAL ADULT MEDICAL EXAMINATION WITH ABNORMAL FINDINGS: ICD-10-CM

## 2022-11-28 DIAGNOSIS — Z11.59 SCREENING FOR VIRAL DISEASE: ICD-10-CM

## 2022-11-28 LAB
25(OH)D3 SERPL-MCNC: 26 NG/ML (ref 30–100)
ALBUMIN SERPL BCP-MCNC: 4.4 G/DL (ref 3.5–5)
ALP SERPL-CCNC: 81 U/L (ref 43–122)
ALT SERPL W P-5'-P-CCNC: 15 U/L
ANION GAP SERPL CALCULATED.3IONS-SCNC: 6 MMOL/L (ref 5–14)
AST SERPL W P-5'-P-CCNC: 24 U/L (ref 14–36)
BASOPHILS # BLD AUTO: 0.03 THOUSANDS/ÂΜL (ref 0–0.1)
BASOPHILS NFR BLD AUTO: 1 % (ref 0–1)
BILIRUB SERPL-MCNC: 0.81 MG/DL (ref 0.2–1)
BUN SERPL-MCNC: 15 MG/DL (ref 5–25)
CALCIUM SERPL-MCNC: 10 MG/DL (ref 8.4–10.2)
CHLORIDE SERPL-SCNC: 106 MMOL/L (ref 96–108)
CHOLEST SERPL-MCNC: 270 MG/DL
CO2 SERPL-SCNC: 30 MMOL/L (ref 21–32)
CREAT SERPL-MCNC: 0.72 MG/DL (ref 0.6–1.2)
EOSINOPHIL # BLD AUTO: 0.24 THOUSAND/ÂΜL (ref 0–0.61)
EOSINOPHIL NFR BLD AUTO: 4 % (ref 0–6)
ERYTHROCYTE [DISTWIDTH] IN BLOOD BY AUTOMATED COUNT: 12 % (ref 11.6–15.1)
GFR SERPL CREATININE-BSD FRML MDRD: 97 ML/MIN/1.73SQ M
GLUCOSE P FAST SERPL-MCNC: 98 MG/DL (ref 70–99)
HBV SURFACE AB SER-ACNC: 41.98 MIU/ML
HCT VFR BLD AUTO: 42.1 % (ref 34.8–46.1)
HDLC SERPL-MCNC: 49 MG/DL
HGB BLD-MCNC: 14.3 G/DL (ref 11.5–15.4)
IMM GRANULOCYTES # BLD AUTO: 0.02 THOUSAND/UL (ref 0–0.2)
IMM GRANULOCYTES NFR BLD AUTO: 0 % (ref 0–2)
LDLC SERPL CALC-MCNC: 196 MG/DL
LYMPHOCYTES # BLD AUTO: 2.41 THOUSANDS/ÂΜL (ref 0.6–4.47)
LYMPHOCYTES NFR BLD AUTO: 43 % (ref 14–44)
MCH RBC QN AUTO: 30.9 PG (ref 26.8–34.3)
MCHC RBC AUTO-ENTMCNC: 34 G/DL (ref 31.4–37.4)
MCV RBC AUTO: 91 FL (ref 82–98)
MONOCYTES # BLD AUTO: 0.55 THOUSAND/ÂΜL (ref 0.17–1.22)
MONOCYTES NFR BLD AUTO: 10 % (ref 4–12)
NEUTROPHILS # BLD AUTO: 2.34 THOUSANDS/ÂΜL (ref 1.85–7.62)
NEUTS SEG NFR BLD AUTO: 42 % (ref 43–75)
NONHDLC SERPL-MCNC: 221 MG/DL
NRBC BLD AUTO-RTO: 0 /100 WBCS
PLATELET # BLD AUTO: 188 THOUSANDS/UL (ref 149–390)
PMV BLD AUTO: 9.6 FL (ref 8.9–12.7)
POTASSIUM SERPL-SCNC: 4.6 MMOL/L (ref 3.5–5.3)
PROT SERPL-MCNC: 7.9 G/DL (ref 6.4–8.4)
RBC # BLD AUTO: 4.63 MILLION/UL (ref 3.81–5.12)
SODIUM SERPL-SCNC: 142 MMOL/L (ref 135–147)
TRIGL SERPL-MCNC: 123 MG/DL
WBC # BLD AUTO: 5.59 THOUSAND/UL (ref 4.31–10.16)

## 2022-12-19 ENCOUNTER — TELEPHONE (OUTPATIENT)
Dept: OTHER | Facility: OTHER | Age: 52
End: 2022-12-19

## 2022-12-20 DIAGNOSIS — E78.00 PURE HYPERCHOLESTEROLEMIA: Primary | ICD-10-CM

## 2022-12-20 RX ORDER — ATORVASTATIN CALCIUM 40 MG/1
40 TABLET, FILM COATED ORAL DAILY
Qty: 90 TABLET | Refills: 3 | Status: SHIPPED | OUTPATIENT
Start: 2022-12-20

## 2022-12-20 NOTE — TELEPHONE ENCOUNTER
No appt needed  Dear Lindsey Mcnair: Timothy Bonilla reported that you have low vitamin D  Please purchase vitamin D 2000 units from OTC at any pharmacy and take 1 capsule daily for the next 6 months  Also your cholesterol is very high  You need to start taking cholesterol-lowering medication  I sent to your pharmacy atorvastatin 40 mg take 1 tablet daily at any time  Also try to decrease the amount of saturated fat in diet  Pay attention to the labels about the grocery stores when you are purchasing fatty food  The percentage of saturated fat should be less than 5%  In the percentage of polyunsaturated fats should be high       It is very important that you exercise in the routine way at least 3-1/2 hours/week to decrease the risk that the cholesterol is causing, like vascular risk (heart attacks, stroke, etc )

## 2023-11-30 ENCOUNTER — OFFICE VISIT (OUTPATIENT)
Dept: FAMILY MEDICINE CLINIC | Facility: CLINIC | Age: 53
End: 2023-11-30
Payer: COMMERCIAL

## 2023-11-30 VITALS
HEIGHT: 59 IN | HEART RATE: 85 BPM | RESPIRATION RATE: 16 BRPM | OXYGEN SATURATION: 97 % | DIASTOLIC BLOOD PRESSURE: 80 MMHG | TEMPERATURE: 97.9 F | BODY MASS INDEX: 29.23 KG/M2 | SYSTOLIC BLOOD PRESSURE: 110 MMHG | WEIGHT: 145 LBS

## 2023-11-30 DIAGNOSIS — E28.319 MENOPAUSE PRAECOX: ICD-10-CM

## 2023-11-30 DIAGNOSIS — Z12.31 BREAST CANCER SCREENING BY MAMMOGRAM: ICD-10-CM

## 2023-11-30 DIAGNOSIS — Q89.3 SITUS INVERSUS: ICD-10-CM

## 2023-11-30 DIAGNOSIS — E55.9 VITAMIN D DEFICIENCY: ICD-10-CM

## 2023-11-30 DIAGNOSIS — Z00.01 ENCOUNTER FOR GENERAL ADULT MEDICAL EXAMINATION WITH ABNORMAL FINDINGS: Primary | ICD-10-CM

## 2023-11-30 DIAGNOSIS — Z12.4 CERVICAL CANCER SCREENING: ICD-10-CM

## 2023-11-30 DIAGNOSIS — E78.00 PURE HYPERCHOLESTEROLEMIA: ICD-10-CM

## 2023-11-30 PROCEDURE — 99396 PREV VISIT EST AGE 40-64: CPT | Performed by: FAMILY MEDICINE

## 2023-11-30 RX ORDER — ATORVASTATIN CALCIUM 40 MG/1
40 TABLET, FILM COATED ORAL DAILY
Qty: 90 TABLET | Refills: 3 | Status: SHIPPED | OUTPATIENT
Start: 2023-11-30

## 2023-11-30 RX ORDER — B-COMPLEX WITH VITAMIN C
1 TABLET ORAL 2 TIMES DAILY WITH MEALS
Qty: 180 TABLET | Refills: 3 | Status: SHIPPED | OUTPATIENT
Start: 2023-11-30

## 2023-11-30 NOTE — PROGRESS NOTES
ameSisto Goldberg      : 1970      MRN: [id number]  Encounter Provider: Will Ocasio MD  Encounter Date: 2023   Encounter department: [address] [place] [address]     1. Breast cancer screening by mammogram  -     Mammo screening bilateral w 3d & cad; Future; Expected date: 2024    2. Encounter for general adult medical examination with abnormal findings  -     CBC and differential; Future  -     Comprehensive metabolic panel; Future  -     Lipid panel; Future    3. Pure hypercholesterolemia  -     atorvastatin (LIPITOR) 40 mg tablet; Take 1 tablet (40 mg total) by mouth daily    4. BMI 29.0-29.9,adult    5. Menopause praecox  -     calcium carbonate-vitamin D 500 mg-5 mcg per tablet; Take 1 tablet by mouth 2 (two) times a day with meals    6. Vitamin D deficiency  -     Vitamin D 25 hydroxy; Future    7. Colon cancer screening    8. Cervical cancer screening  -     Ambulatory Referral to Gynecology; Future    9. Situs inversus      BMI Counseling: Body mass index is 29.29 kg/m². The BMI is above normal. Nutrition recommendations include reducing portion sizes. Exercise recommendations include moderate aerobic physical activity for 150 minutes/week. Chief Complaint  Annual physical exam and follow-up on vitamin D supplementation. History of Present Illness  Patient is a 50-year-old female presenting for a routine annual physical exam. She reports general well-being with no new acute issues. She has been experiencing some sleep disturbances due to a new work schedule starting at 3 AM. She continues to take over-the-counter vitamin D daily. Medications  Vitamin D (OTC) daily. Allergies  No known drug allergies. Past Surgical History  Situs inversus. Social History  Works early morning shifts, non-smoker, alcohol use not specified. Family History  One daughter, no significant family medical history reported.     Review of Systems  General: No weight loss or fatigue. Sleep disturbances due to work schedule. Endocrine: No heat or cold intolerance. No polyuria or polydipsia. /80 (BP Location: Left arm, Patient Position: Sitting, Cuff Size: Standard)   Pulse 85   Temp 97.9 °F (36.6 °C) (Tympanic)   Resp 16   Ht 4' 11" (1.499 m)   Wt 65.8 kg (145 lb)   LMP 04/15/2019 (Approximate)   SpO2 97%   BMI 29.29 kg/m²   . she looks in non distress. Oriented, Lungs are clear. Heart is having Normal rhythm w/o murmurs. Abdomen is protuberant/obese. Neurological system has no deficit and walks normal.      Assessment and Plan  Patient is here for physical exam. I found patient without any distress. Patient does not have a chronic condition. I  Recommended to patient to  exercise at least 3 1/2  hours per week and maintain a healthy diet with low carbohydrate and low saturated fat. Patient understands the need for an annual physical exam.     Continue current dose of vitamin D supplementation. Recheck vitamin D levels to assess for adequacy of current regimen. Address sleep disturbances by providing sleep hygiene advice and considering work schedule adjustment if possible. Schedule mammography as part of routine preventive care. Complete blood work to include CBC, lipid panel, and vitamin D levels to monitor for anemia, cholesterol levels, and vitamin D deficiency. Encourage patient to use the clinic's application for easier communication and scheduling.         MD Lisa Muller S Hugo Rodríguez

## 2023-12-20 ENCOUNTER — TELEPHONE (OUTPATIENT)
Dept: OBGYN CLINIC | Facility: MEDICAL CENTER | Age: 53
End: 2023-12-20

## 2023-12-20 NOTE — TELEPHONE ENCOUNTER
----- Message from Octavio Camacho MD sent at 11/30/2023  3:58 PM EST -----  Please call patient for consultation with gynecologist.

## 2024-01-15 ENCOUNTER — APPOINTMENT (OUTPATIENT)
Dept: LAB | Facility: HOSPITAL | Age: 54
End: 2024-01-15
Payer: COMMERCIAL

## 2024-01-15 DIAGNOSIS — Z00.01 ENCOUNTER FOR GENERAL ADULT MEDICAL EXAMINATION WITH ABNORMAL FINDINGS: ICD-10-CM

## 2024-01-15 DIAGNOSIS — E55.9 VITAMIN D DEFICIENCY: ICD-10-CM

## 2024-01-15 LAB
25(OH)D3 SERPL-MCNC: 31.6 NG/ML (ref 30–100)
ALBUMIN SERPL BCP-MCNC: 4.5 G/DL (ref 3.5–5)
ALP SERPL-CCNC: 61 U/L (ref 34–104)
ALT SERPL W P-5'-P-CCNC: 9 U/L (ref 7–52)
ANION GAP SERPL CALCULATED.3IONS-SCNC: 6 MMOL/L
AST SERPL W P-5'-P-CCNC: 15 U/L (ref 13–39)
BASOPHILS # BLD AUTO: 0.04 THOUSANDS/ÂΜL (ref 0–0.1)
BASOPHILS NFR BLD AUTO: 1 % (ref 0–1)
BILIRUB SERPL-MCNC: 0.77 MG/DL (ref 0.2–1)
BUN SERPL-MCNC: 14 MG/DL (ref 5–25)
CALCIUM SERPL-MCNC: 9.8 MG/DL (ref 8.4–10.2)
CHLORIDE SERPL-SCNC: 106 MMOL/L (ref 96–108)
CHOLEST SERPL-MCNC: 235 MG/DL
CO2 SERPL-SCNC: 30 MMOL/L (ref 21–32)
CREAT SERPL-MCNC: 0.68 MG/DL (ref 0.6–1.3)
EOSINOPHIL # BLD AUTO: 0.23 THOUSAND/ÂΜL (ref 0–0.61)
EOSINOPHIL NFR BLD AUTO: 5 % (ref 0–6)
ERYTHROCYTE [DISTWIDTH] IN BLOOD BY AUTOMATED COUNT: 11.9 % (ref 11.6–15.1)
GFR SERPL CREATININE-BSD FRML MDRD: 100 ML/MIN/1.73SQ M
GLUCOSE P FAST SERPL-MCNC: 103 MG/DL (ref 65–99)
HCT VFR BLD AUTO: 43.2 % (ref 34.8–46.1)
HDLC SERPL-MCNC: 59 MG/DL
HGB BLD-MCNC: 14.7 G/DL (ref 11.5–15.4)
IMM GRANULOCYTES # BLD AUTO: 0.01 THOUSAND/UL (ref 0–0.2)
IMM GRANULOCYTES NFR BLD AUTO: 0 % (ref 0–2)
LDLC SERPL CALC-MCNC: 145 MG/DL (ref 0–100)
LYMPHOCYTES # BLD AUTO: 2.17 THOUSANDS/ÂΜL (ref 0.6–4.47)
LYMPHOCYTES NFR BLD AUTO: 43 % (ref 14–44)
MCH RBC QN AUTO: 31.8 PG (ref 26.8–34.3)
MCHC RBC AUTO-ENTMCNC: 34 G/DL (ref 31.4–37.4)
MCV RBC AUTO: 94 FL (ref 82–98)
MONOCYTES # BLD AUTO: 0.39 THOUSAND/ÂΜL (ref 0.17–1.22)
MONOCYTES NFR BLD AUTO: 8 % (ref 4–12)
NEUTROPHILS # BLD AUTO: 2.14 THOUSANDS/ÂΜL (ref 1.85–7.62)
NEUTS SEG NFR BLD AUTO: 43 % (ref 43–75)
NONHDLC SERPL-MCNC: 176 MG/DL
NRBC BLD AUTO-RTO: 0 /100 WBCS
PLATELET # BLD AUTO: 198 THOUSANDS/UL (ref 149–390)
PMV BLD AUTO: 9.9 FL (ref 8.9–12.7)
POTASSIUM SERPL-SCNC: 4.5 MMOL/L (ref 3.5–5.3)
PROT SERPL-MCNC: 7.1 G/DL (ref 6.4–8.4)
RBC # BLD AUTO: 4.62 MILLION/UL (ref 3.81–5.12)
SODIUM SERPL-SCNC: 142 MMOL/L (ref 135–147)
TRIGL SERPL-MCNC: 154 MG/DL
WBC # BLD AUTO: 4.98 THOUSAND/UL (ref 4.31–10.16)

## 2024-01-15 PROCEDURE — 85025 COMPLETE CBC W/AUTO DIFF WBC: CPT

## 2024-01-15 PROCEDURE — 82306 VITAMIN D 25 HYDROXY: CPT

## 2024-01-15 PROCEDURE — 36415 COLL VENOUS BLD VENIPUNCTURE: CPT

## 2024-01-15 PROCEDURE — 80053 COMPREHEN METABOLIC PANEL: CPT

## 2024-01-15 PROCEDURE — 80061 LIPID PANEL: CPT

## 2024-01-23 ENCOUNTER — TELEPHONE (OUTPATIENT)
Age: 54
End: 2024-01-23

## 2024-01-23 NOTE — TELEPHONE ENCOUNTER
Try to call Pt came unable to contact m for patient to call office back     We have Gynecologist join us recently she will be able to assist patient need, best phone number to scheduled a appt is [phone] Dr Barbi Howell

## 2024-01-23 NOTE — TELEPHONE ENCOUNTER
Patient called asking about referral and appt for pap smear. Tried to warm transfer to the office, call disconnected.

## 2024-07-23 ENCOUNTER — OFFICE VISIT (OUTPATIENT)
Dept: FAMILY MEDICINE CLINIC | Facility: CLINIC | Age: 54
End: 2024-07-23
Payer: COMMERCIAL

## 2024-07-23 VITALS
HEIGHT: 59 IN | SYSTOLIC BLOOD PRESSURE: 120 MMHG | HEART RATE: 65 BPM | TEMPERATURE: 98.1 F | WEIGHT: 140.8 LBS | BODY MASS INDEX: 28.39 KG/M2 | DIASTOLIC BLOOD PRESSURE: 74 MMHG | RESPIRATION RATE: 16 BRPM | OXYGEN SATURATION: 98 %

## 2024-07-23 DIAGNOSIS — R53.82 CHRONIC FATIGUE: ICD-10-CM

## 2024-07-23 DIAGNOSIS — Q89.3 SITUS INVERSUS: ICD-10-CM

## 2024-07-23 DIAGNOSIS — H11.32 SUBCONJUNCTIVAL HEMORRHAGE OF LEFT EYE: ICD-10-CM

## 2024-07-23 DIAGNOSIS — Z78.0 MENOPAUSE PRESENT: ICD-10-CM

## 2024-07-23 DIAGNOSIS — H81.13 BENIGN PAROXYSMAL POSITIONAL VERTIGO DUE TO BILATERAL VESTIBULAR DISORDER: Primary | ICD-10-CM

## 2024-07-23 PROCEDURE — 99214 OFFICE O/P EST MOD 30 MIN: CPT | Performed by: FAMILY MEDICINE

## 2024-07-23 RX ORDER — MECLIZINE HCL 12.5 MG/1
12.5 TABLET ORAL EVERY 8 HOURS PRN
Qty: 60 TABLET | Refills: 0 | Status: SHIPPED | OUTPATIENT
Start: 2024-07-23

## 2024-07-23 RX ORDER — ACETAMINOPHEN AND CODEINE PHOSPHATE 120; 12 MG/5ML; MG/5ML
1 SOLUTION ORAL DAILY
Qty: 30 TABLET | Refills: 5 | Status: SHIPPED | OUTPATIENT
Start: 2024-07-23

## 2024-07-23 RX ORDER — BIMATOPROST 0.1 MG/ML
SOLUTION/ DROPS OPHTHALMIC
COMMUNITY
Start: 2024-07-02

## 2024-07-23 RX ORDER — ESTRADIOL 0.05 MG/D
1 PATCH TRANSDERMAL WEEKLY
Qty: 4 PATCH | Refills: 5 | Status: SHIPPED | OUTPATIENT
Start: 2024-07-23

## 2024-07-23 NOTE — PROGRESS NOTES
Name: Lolis Echevarria      : 1970      MRN: [id number]  Encounter Provider: Octavio Camacho MD  Encounter Date: 2024   Encounter department: [facility] PRIMARY CARE [facility]     Consult Note for Dizziness, Eye Problem, and Nausea    Chief Complaint:    - Dizziness for two weeks    - Reddened left eye    - Nausea      History of Present Illness:    The patient presents with dizziness for the past two weeks, which is exacerbated upon waking and moving from a lying to a standing position. She describes episodes of vertigo that require her to sit and stabilize herself before standing. These episodes are accompanied by sweating and nausea. The patient also reports a reddened left eye and mental confusion. She has a history of menopause since 2018 and has noted a decline in her overall health over the past five years, including poor sleep quality.    Medications:    - Lumigan 0.01% ophthalmic drops    - Meclizine (ANTIVERT) 12.5 MG tablet, Take 1 tablet (12.5 mg total) by mouth every 8 hours as needed for dizziness    - Atorvastatin (LIPITOR) 40 mg tablet, Take 1 tablet (40 mg total) by mouth daily    - Calcium carbonate-vitamin D 500 mg-5 mcg per tablet, Take 1 tablet by mouth 2 times a day with meals    - Cholecalciferol 25 MCG (1000 UT) tablet, Take 1,000 Units by mouth daily    - Estradiol (Climara) 0.05 mg/24 hr; Place 1 patch on the skin over 7 days once a week    - Norethindrone (Faye) 0.35 MG tablet; Take 1 tablet (0.35 mg total) by mouth daily at bedtime      Allergies:    No Active Allergies    Past Medical History:    - Anemia    - Situs inversus      Past Surgical History:    -  section    - Nasal sinus surgery      Social History:    Not provided    Family History:    Not provided    Review of Systems:    - HEENT: Recurrent vertigo, no ear problems, left eye redness    - Respiratory: Negative for apnea, cough, shortness of breath, and wheezing    -  "Cardiovascular: Negative for chest pain and palpitations    - Gastrointestinal: Negative for bowel incontinence    - Genitourinary: Negative for bladder incontinence, dysuria, and pelvic pain    - Skin: Negative    - Neurological: Negative for tingling, weakness, and headaches    - Psychiatric/Behavioral: Negative      Vital Signs:    - /74 (Right arm, Sitting, Standard cuff)    - Pulse 65    - Temp 98.1 °F (Tympanic)    - Resp 16    - Ht 4' 11\" (1.499 m)    - Wt 63.9 kg (140 lb 12.8 oz)    - LMP 04/15/2019 (Approximate)    - SpO2 98%    - BMI 28.44 kg/m²      Physical Exam:    - Non-distressed    - HEENT: Subconjunctival bleeding on the left over the nasal area, ears normal    - Respiratory: Normal respiratory effort    - Cardiovascular: Pulse regular, heart without murmurs      Lab Results:    Not provided    Imaging and Other Relevant Results:    Not provided    Assessment and Plan:    1. Benign paroxysmal positional vertigo.  The Eric-Daroff exercise camera.gif is one of several exercises intended to speed up the compensation process and end the symptoms of vertigo. It often is prescribed for people who have benign paroxysmal positional vertigo (BPPV) and sometimes for labyrinthitis. These exercises will not cure these conditions. But over time they can reduce symptoms of vertigo.      - Continue meclizine (ANTIVERT) 12.5 MG tablet; Take 1 tablet (12.5 mg total) by mouth every 8 hours as needed for dizziness    2. Situs inversus       - No current complaints    3. Subconjunctival hemorrhage of left eye       - reassurance. Monitor for resolution    4. Menopause    symptoms of menopause: anxiety, insomnia, moodiness, no energy.   Estrogens help to keep woman healthier and prevent certain cancers and cardiac disease when treated during first 10 years from starting menopause.   Micronized progesterone may help with insomnia and oppose the effect of estradiol in endometrium as way to prevent endometrial " cancer.     - Will start current management with estradiol (Climara) 0.05 mg/24 hr patch and norethindrone (Faye) 0.35 MG tablet    5. Chronic fatigue       - CBC and differential; Future; Expected date: 07/23/2024       - Comprehensive metabolic panel; Future       - TSH, 3rd generation with Free T4 reflex; Future        Octavio Camacho MD   [facility] PRIMARY CARE [facility]

## 2024-07-26 ENCOUNTER — TELEPHONE (OUTPATIENT)
Age: 54
End: 2024-07-26

## 2024-07-26 NOTE — TELEPHONE ENCOUNTER
Pt called in to verify if there was any active blood work orders placed. Informed patient she does have blood work to complete and she has to fast for atleast 8 hrs before completing. Pt had no further questions or concerns.

## 2024-07-27 ENCOUNTER — LAB (OUTPATIENT)
Dept: LAB | Facility: HOSPITAL | Age: 54
End: 2024-07-27
Payer: COMMERCIAL

## 2024-07-27 DIAGNOSIS — R53.82 CHRONIC FATIGUE: ICD-10-CM

## 2024-07-27 LAB
ALBUMIN SERPL BCG-MCNC: 4.2 G/DL (ref 3.5–5)
ALP SERPL-CCNC: 62 U/L (ref 34–104)
ALT SERPL W P-5'-P-CCNC: 9 U/L (ref 7–52)
ANION GAP SERPL CALCULATED.3IONS-SCNC: 6 MMOL/L (ref 4–13)
AST SERPL W P-5'-P-CCNC: 14 U/L (ref 13–39)
BASOPHILS # BLD AUTO: 0.04 THOUSANDS/ÂΜL (ref 0–0.1)
BASOPHILS NFR BLD AUTO: 1 % (ref 0–1)
BILIRUB SERPL-MCNC: 0.73 MG/DL (ref 0.2–1)
BUN SERPL-MCNC: 18 MG/DL (ref 5–25)
CALCIUM SERPL-MCNC: 10 MG/DL (ref 8.4–10.2)
CHLORIDE SERPL-SCNC: 107 MMOL/L (ref 96–108)
CO2 SERPL-SCNC: 28 MMOL/L (ref 21–32)
CREAT SERPL-MCNC: 0.64 MG/DL (ref 0.6–1.3)
EOSINOPHIL # BLD AUTO: 0.18 THOUSAND/ÂΜL (ref 0–0.61)
EOSINOPHIL NFR BLD AUTO: 2 % (ref 0–6)
ERYTHROCYTE [DISTWIDTH] IN BLOOD BY AUTOMATED COUNT: 11.9 % (ref 11.6–15.1)
GFR SERPL CREATININE-BSD FRML MDRD: 102 ML/MIN/1.73SQ M
GLUCOSE P FAST SERPL-MCNC: 106 MG/DL (ref 65–99)
HCT VFR BLD AUTO: 41.2 % (ref 34.8–46.1)
HGB BLD-MCNC: 13.6 G/DL (ref 11.5–15.4)
IMM GRANULOCYTES # BLD AUTO: 0.02 THOUSAND/UL (ref 0–0.2)
IMM GRANULOCYTES NFR BLD AUTO: 0 % (ref 0–2)
LYMPHOCYTES # BLD AUTO: 2.04 THOUSANDS/ÂΜL (ref 0.6–4.47)
LYMPHOCYTES NFR BLD AUTO: 27 % (ref 14–44)
MCH RBC QN AUTO: 31.6 PG (ref 26.8–34.3)
MCHC RBC AUTO-ENTMCNC: 33 G/DL (ref 31.4–37.4)
MCV RBC AUTO: 96 FL (ref 82–98)
MONOCYTES # BLD AUTO: 0.5 THOUSAND/ÂΜL (ref 0.17–1.22)
MONOCYTES NFR BLD AUTO: 7 % (ref 4–12)
NEUTROPHILS # BLD AUTO: 4.69 THOUSANDS/ÂΜL (ref 1.85–7.62)
NEUTS SEG NFR BLD AUTO: 63 % (ref 43–75)
NRBC BLD AUTO-RTO: 0 /100 WBCS
PLATELET # BLD AUTO: 193 THOUSANDS/UL (ref 149–390)
PMV BLD AUTO: 10.1 FL (ref 8.9–12.7)
POTASSIUM SERPL-SCNC: 4.7 MMOL/L (ref 3.5–5.3)
PROT SERPL-MCNC: 7 G/DL (ref 6.4–8.4)
RBC # BLD AUTO: 4.31 MILLION/UL (ref 3.81–5.12)
SODIUM SERPL-SCNC: 141 MMOL/L (ref 135–147)
TSH SERPL DL<=0.05 MIU/L-ACNC: 1.73 UIU/ML (ref 0.45–4.5)
WBC # BLD AUTO: 7.47 THOUSAND/UL (ref 4.31–10.16)

## 2024-07-27 PROCEDURE — 36415 COLL VENOUS BLD VENIPUNCTURE: CPT

## 2024-07-27 PROCEDURE — 80053 COMPREHEN METABOLIC PANEL: CPT

## 2024-07-27 PROCEDURE — 85025 COMPLETE CBC W/AUTO DIFF WBC: CPT

## 2024-07-27 PROCEDURE — 84443 ASSAY THYROID STIM HORMONE: CPT

## 2024-07-29 NOTE — RESULT ENCOUNTER NOTE
Please call patient, the labs are reported mild elevation of blood sugar.  This is prediabetes.  T she needs to exercise more and control the amount of sugary in the diet.  Rest of the labs are all normal.

## 2024-12-04 ENCOUNTER — OFFICE VISIT (OUTPATIENT)
Dept: FAMILY MEDICINE CLINIC | Facility: CLINIC | Age: 54
End: 2024-12-04
Payer: COMMERCIAL

## 2024-12-04 VITALS
SYSTOLIC BLOOD PRESSURE: 102 MMHG | HEIGHT: 59 IN | BODY MASS INDEX: 29.23 KG/M2 | OXYGEN SATURATION: 97 % | HEART RATE: 76 BPM | DIASTOLIC BLOOD PRESSURE: 58 MMHG | WEIGHT: 145 LBS | TEMPERATURE: 97.8 F | RESPIRATION RATE: 16 BRPM

## 2024-12-04 DIAGNOSIS — Z00.00 ANNUAL PHYSICAL EXAM: Primary | ICD-10-CM

## 2024-12-04 DIAGNOSIS — E78.00 PURE HYPERCHOLESTEROLEMIA: ICD-10-CM

## 2024-12-04 DIAGNOSIS — Z13.1 DIABETES MELLITUS SCREENING: ICD-10-CM

## 2024-12-04 DIAGNOSIS — Z72.820 POOR SLEEP: ICD-10-CM

## 2024-12-04 PROCEDURE — 99213 OFFICE O/P EST LOW 20 MIN: CPT

## 2024-12-04 PROCEDURE — 99396 PREV VISIT EST AGE 40-64: CPT

## 2024-12-04 RX ORDER — PHENOL 1.4 %
10 AEROSOL, SPRAY (ML) MUCOUS MEMBRANE
Qty: 21 TABLET | Refills: 0 | Status: SHIPPED | OUTPATIENT
Start: 2024-12-04 | End: 2024-12-04 | Stop reason: DRUGHIGH

## 2024-12-04 RX ORDER — MELATONIN 5 MG
5 TABLET,CHEWABLE ORAL
Qty: 21 TABLET | Refills: 0 | Status: SHIPPED | OUTPATIENT
Start: 2024-12-04 | End: 2024-12-25

## 2024-12-04 NOTE — PATIENT INSTRUCTIONS
"Patient Education     [place] higiene del sueño   Conceptos Básicos   Redactado por los médicos y editores de [employer]   ¿Qué es la higiene del sueño? -- \"Higiene del sueño\" es el término que utilizan los médicos para referirse a los hábitos que ayudan a dormir imelda. Dormir imelda significa dormir lo suficiente y de forma reparadora.  Las cosas que hacemos kaitlyn el día y antes de acostarnos pueden afectar lo imelda que dormimos. A veces, hacemos cosas que podrían empeorar nuestro sueño sin darnos cuenta. [place] buena higiene del sueño consiste en comprender cómo dormir mejor.  ¿Por qué es importante el sueño? -- Necesita dormir para sentirse despierto kaitlyn el día y para estar lo suficientemente alerta a fin de realizar zita actividades normales. También es importante para mantener palm cuerpo yoly. Por ejemplo, dormir:   Ayuda a aprender información y formar recuerdos - Mientras duerme, el cerebro procesa y almacena información y experiencias de cuando estaba despierto.   Puede ayudar a reducir palm riesgo de enfermarse o a mejorarse más rápido si está enfermo   Ayuda a los bebés y a los niños a crecer - [place] se debe a que el cuerpo libera más hormona del crecimiento kaitlyn el sueño que kaitlyn el día.   Ayuda a niños y adultos a desarrollar los músculos y reparar las células y los tejidos del cuerpo  No dormir lo suficiente puede tener efectos negativos. Por ejemplo:   Irritabilidad, ansiedad o depresión   Problemas en las relaciones, especialmente para los niños y adolescentes   Un mayor riesgo de presión arterial zeina, enfermedades cardíacas, accidentes cerebrovasculares (derrames), enfermedades renales, obesidad y diabetes tipo 2  ¿Cuántas horas de sueño necesito? -- Depende de palm edad, palm estilo de trace y palm estado de estrella general. Palm médico o enfermero puede ayudarlo a comprender exactamente cuánto sueño necesita usted o palm hijo.  Las recomendaciones generales en cuanto al sueño son:   Recién nacidos (de 0 a 3 meses) " "- De 14 a 17 horas al día   Bebés (de 4 a 11 meses) - De 12 a 15 horas al día   Niños pequeños (de 1 a 2 años) - De 11 a 14 horas al día   Niños en edad preescolar (de 3 a 5 años) - De 10 a 13 horas al día   Niños en edad escolar (de 6 a 13 años) - De 9 a 11 horas al día   Adolescentes (de 14 a 17 años) - De 8 a 10 horas al día   Adultos (26 a 64 años) - De 7 a 9 horas al día   Adultos mayores (65 años o más) - De 7 a 8 horas al día  Si no duerme lo suficiente cada noche, acumula sreekanth \"deuda de sueño\". Por ejemplo, si un dequan en edad escolar duerme 8 horas en lugar de 10, tiene sreeaknth deuda de sueño de 2 horas. Las personas que tienen sreekanth deuda de sueño podrían necesitar más horas de sueño de las recomendadas para recuperarlas. Recuperar el sueño perdido puede resultar difícil. Lo más saludable es dormir el número de horas recomendado cada noche. Agnieszka si esto no es posible debido a palm trabajo u otras responsabilidades, intente recuperar horas de sueño cuando pueda.  Recuerde que la duración del sueño no es lo único que contribuye a un buen sueño. La calidad del sueño es maldonado importante tremayne el número de horas. [place] se debe a que palm cerebro y palm cuerpo atraviesan \"ciclos de sueño\" mientras duerme. Cada ciclo tiene un propósito diferente. Si no duerme profundamente o si se despierta con frecuencia kaitlyn la noche, es posible que no tenga suficiente tiempo en cada ciclo de sueño.  ¿Cómo puedo dormir mejor? -- Tener sreekanth buena higiene del sueño significa:   Irse a dormir y levantarse aproximadamente a la misma hora todos los días.   Beber café, té y otras bebidas o alimentos con cafeína solo por la mañana.   Evitar comer cerca de la hora de acostarse - Trate de no comer comidas abundantes poco antes de acostarse. Es mejor cenar temprano y que la comida que consuma sea saludable y saciante (agnieszka no demasiado pesada). Trate de evitar los refrigerios nocturnos.   Evitar el alcohol al final de la tarde, por la noche y antes de " "irse a dormir.   Evite fumar, en especial por la noche.   Mantener palm habitación oscura, fresca, silenciosa y sin elementos que le recuerden el trabajo u otros factores que le provoquen estrés.   Trate de resolver los problemas antes de irse a dormir.   Hacer mucha actividad física, agnieszka no dana antes de acostarse - Hacer ejercicio de 4 a 6 horas antes de acostarse tiene el mejor efecto en el sueño.   Evitar mirar la pantalla de teléfonos o dispositivos de lectura (\"libros electrónicos\") que emanan mireya antes de irse a dormir - [place] puede hacer que le cueste conciliar el sueño. No hay pruebas fehacientes de que usar gafas especiales con \"filtro de mireya miriam\" mejore el sueño.   Mantener el lugar donde duerme tranquilo y oscuro - Si es necesario, puede utilizar sreekanth máquina de ruido dailey o tapones para oídos para bloquear el maria e. Las ml opacas o un antifaz pueden ayudar a bloquear la mireya.   No mirar la hora por la noche - Intente mantener los despertadores, relojes o teléfonos inteligentes fuera de palm liu de visión. Revisar la hora en medio de la noche puede hacer que se sienta más despierto y que le resulte difícil volver a dormir.   Evitar las siestas largas si tiene dificultad para dormir por la noche, especialmente en las últimas horas de la tarde. Las siestas cortas (de alrededor de 20 minutos) pueden ser útiles, especialmente si palm horario de trabajo cambia cada día y usted necesita estar alerta a distintas horas.  ¿Qué sucede si tengo dificultad para dormir? -- Todos dormimos mal de vez en cuando. Agnieszka si regularmente tiene dificultad para dormir, consulte a palm médico o enfermero. Puede comprobar si tiene un \"trastorno del sueño\". Luego, podrá trabajar con usted para tratar el problema.  Llame para pedir asesoramiento si usted o palm hijo:   Regularmente no duerme la cantidad recomendada de sueño   Tiene mucha dificultad para despertarse por la mañana   Se siente cansado o tiene problemas para " concentrarse kaitlyn el día.   Se despierta con frecuencia kaitlyn la noche y no puede volver a dormir   Tiene mucha dificultad para conciliar el sueño por la noche   Tiene otros problemas relacionados con el sueño o causados por sue  Todos los artículos se actualizan a medida que se descubre nueva evidencia y culmina nuestro proceso de evaluación por homólogos   Sue artículo se recuperó de UpToDate el: Mar 16, 2024.  Artículo 211364 Versión [id number].es-419.1  Release: 32.2.4 - C32.74  © 2024 UpToDate, Inc. Todos los derechos reservados.  Exención de responsabilidad y uso de la información del consumidor   Descargo de responsabilidad: esta información generalizada es un resumen limitado de información sobre el diagnóstico, el tratamiento y/o los medicamentos. No pretende ser exhaustiva y se debe utilizar tremayne herramienta para ayudar al usuario a comprender y/o evaluar las posibles opciones de diagnóstico y tratamiento. No incluye toda la información sobre afecciones, tratamientos, medicamentos, efectos secundarios o riesgos puedan ser aplicables a un paciente específico. No tiene el propósito de servir tremayne recomendación médica ni de sustituir la recomendación médica, el diagnóstico o el tratamiento de un profesional de atención médica que se base en el examen y la evaluación de sue profesional de la estrella respecto a las circunstancias específicas y únicas del paciente. Los pacientes deben hablar con un profesional de atención médica para obtener información completa sobre palm estrella, cuestiones médicas y opciones de tratamiento, incluidos los riesgos o los beneficios relacionados con el uso de medicamentos. Esta información no certifica que los tratamientos o medicamentos darrin seguros, eficaces o estén aprobados para tratar a un paciente específico. UpToDate, Inc. y zita afiliados renuncian a cualquier garantía o responsabilidad relacionada con esta información o el uso de la misma.El uso de esta información está sujeto  a las Condiciones de uso, disponibles en https://www.[employer].com/en/know/clinical-effectiveness-terms. 2024© "[employer]te, Inc. y zita afiliados y/o licenciantes. Todos los derechos reservados.  Copyright   © 2024 UpToDate, Inc. Todos los derechos reservados.

## 2024-12-04 NOTE — ASSESSMENT & PLAN NOTE
No excessive daytime sleepiness or interference with work.  Reviewed good sleep hygiene with pt and will give pt home reading material.  Poor sleep can be due to menopause however patient has not responded to norethindrone 0.35 mg daily or estradiol patch, will discontinue these today.  Will place order for melatonin 5mg HS PRN to be taken 1hr prior to sleep. Should melatonin not help can consider higher dose or another medication.  Orders:    Melatonin 5 MG CHEW; Chew 5 mg daily at bedtime as needed (Insomnia) for up to 21 days

## 2024-12-04 NOTE — PROGRESS NOTES
Adult Annual Physical  Name: Lolis Echevarria      : 1970      MRN: [id number]  Encounter Provider: Aguilar Theodore MD  Encounter Date: 2024   Encounter department: [facility] PRIMARY CARE [facility]    Assessment & Plan  Annual physical exam  VS and physical examination wnl. Age appropriate screenings and vaccinations reviewed and ordered as noted below. Healthy diet and exercise counseling provided.    Last pap smear completed 2022: normal. Will be reaching out to EMR team to update records.  Last mammogram completed 9/3/2024: Normal  Last colonoscopy 2022: Normal, repeat in 10 years approximately 2032       Poor sleep  No excessive daytime sleepiness or interference with work.  Reviewed good sleep hygiene with pt and will give pt home reading material.  Poor sleep can be due to menopause however patient has not responded to norethindrone 0.35 mg daily or estradiol patch, will discontinue these today.  Will place order for melatonin 5mg HS PRN to be taken 1hr prior to sleep. Should melatonin not help can consider higher dose or another medication.  Orders:    Melatonin 5 MG CHEW; Chew 5 mg daily at bedtime as needed (Insomnia) for up to 21 days    Pure hypercholesterolemia  Due for repeat lipid panel, order placed.  Orders:    Lipid Panel with Direct LDL reflex; Future    Diabetes mellitus screening    Orders:    Hemoglobin A1C; Future    Immunizations and preventive care screenings were discussed with patient today. Appropriate education was printed on patient's after visit summary.    Counseling:  Alcohol/drug use: discussed moderation in alcohol intake, the recommendations for healthy alcohol use, and avoidance of illicit drug use.  Dental Health: discussed importance of regular tooth brushing, flossing, and dental visits.  Injury prevention: discussed safety/seat belts, safety helmets, smoke detectors, carbon monoxide detectors, and smoking near bedding  "or upholstery.  Sexual health: discussed sexually transmitted diseases, partner selection, use of condoms, avoidance of unintended pregnancy, and contraceptive alternatives.  Exercise: the importance of regular exercise/physical activity was discussed. Recommend exercise 3-5 times per week for at least 30 minutes.          History of Present Illness     Adult Annual Physical:  Patient presents for annual physical.     Diet and Physical Activity:  - Diet/Nutrition: well balanced diet.  - Exercise: walking, strength training exercises, 3-4 times a week on average and 30-60 minutes on average.    Depression Screening:  - PHQ-2 Score: 0    General Health:  - Sleep: 4-6 hours of sleep on average. having problem initiating/maintaining sleep. has tried hormonal therapy as poor sleep was believed to be due to menopause but this has not helped. has also trialed low dose melatonin but no improvement.  - Hearing: normal hearing bilateral ears.  - Vision: wears glasses and goes for regular eye exams.  - Dental: no dental visits for > 1 year and brushes teeth twice daily. occasional dental floss    /GYN Health:  - Follows with GYN: yes.   - Menopause: postmenopausal.   - History of STDs: no  - Contraception:. Not sexually active      Review of Systems   Constitutional:  Negative for chills and fever.   Respiratory:  Negative for cough, chest tightness and shortness of breath.    Cardiovascular:  Negative for chest pain and palpitations.   Gastrointestinal:  Negative for abdominal pain, anal bleeding, blood in stool, constipation, diarrhea, nausea and vomiting.   Genitourinary:  Negative for dysuria and hematuria.   Neurological:  Negative for dizziness, syncope, weakness, numbness and headaches.         Objective   /58 (BP Location: Left arm, Patient Position: Sitting, Cuff Size: Standard)   Pulse 76   Temp 97.8 °F (36.6 °C) (Temporal)   Resp 16   Ht 4' 11\" (1.499 m)   Wt 65.8 kg (145 lb)   LMP 04/15/2019 " (Approximate)   SpO2 97%   BMI 29.29 kg/m²     Physical Exam  Vitals and nursing note reviewed.   Constitutional:       Appearance: Normal appearance.   HENT:      Head: Normocephalic.      Right Ear: Tympanic membrane, ear canal and external ear normal.      Left Ear: Tympanic membrane, ear canal and external ear normal.      Nose: Nose normal.      Mouth/Throat:      Mouth: Mucous membranes are moist.      Pharynx: Oropharynx is clear.   Eyes:      Extraocular Movements: Extraocular movements intact.      Conjunctiva/sclera: Conjunctivae normal.      Pupils: Pupils are equal, round, and reactive to light.   Cardiovascular:      Rate and Rhythm: Normal rate and regular rhythm.      Pulses: Normal pulses.      Heart sounds: Normal heart sounds.   Pulmonary:      Effort: Pulmonary effort is normal.      Breath sounds: Normal breath sounds.   Abdominal:      General: Abdomen is flat. There is no distension.      Palpations: Abdomen is soft.      Tenderness: There is no abdominal tenderness.   Musculoskeletal:         General: Normal range of motion.      Cervical back: Normal range of motion and neck supple.      Right lower leg: No edema.      Left lower leg: No edema.   Skin:     General: Skin is warm and dry.      Capillary Refill: Capillary refill takes less than 2 seconds.   Neurological:      General: No focal deficit present.      Mental Status: She is alert.   Psychiatric:         Mood and Affect: Mood normal.         Behavior: Behavior normal.

## 2024-12-04 NOTE — ASSESSMENT & PLAN NOTE
Due for repeat lipid panel, order placed.  Orders:    Lipid Panel with Direct LDL reflex; Future

## 2024-12-05 ENCOUNTER — TELEPHONE (OUTPATIENT)
Dept: ADMINISTRATIVE | Facility: OTHER | Age: 54
End: 2024-12-05

## 2024-12-05 NOTE — TELEPHONE ENCOUNTER
Upon review of the In Basket request we were able to locate, review, and update the patient chart as requested for Pap Smear (HPV) aka Cervical Cancer Screening.    Any additional questions or concerns should be emailed to the Practice Liaisons via the appropriate education email address, please do not reply via In Basket.    Thank you  Cele Hanna MA   [facility]

## 2024-12-05 NOTE — TELEPHONE ENCOUNTER
----- Message from Malinda SORTO sent at 12/4/2024  4:52 PM EST -----  Regarding: care gap request  12/04/24 4:52 PM    Hello, our patient attached above has had Pap Smear (HPV) aka Cervical Cancer Screening completed/performed. Please assist in updating the patient chart by pulling the Care Everywhere (CE) document. The date of service is 2022.     Thank you,  Malinda Singh MA  [facility]  PRIMARY CARE [facility]